# Patient Record
Sex: MALE | Race: WHITE | Employment: OTHER | URBAN - METROPOLITAN AREA
[De-identification: names, ages, dates, MRNs, and addresses within clinical notes are randomized per-mention and may not be internally consistent; named-entity substitution may affect disease eponyms.]

---

## 2017-09-15 ENCOUNTER — OFFICE VISIT (OUTPATIENT)
Dept: FAMILY MEDICINE | Facility: CLINIC | Age: 67
End: 2017-09-15
Payer: MEDICARE

## 2017-09-15 VITALS
HEART RATE: 69 BPM | TEMPERATURE: 97.8 F | OXYGEN SATURATION: 94 % | SYSTOLIC BLOOD PRESSURE: 139 MMHG | DIASTOLIC BLOOD PRESSURE: 89 MMHG | BODY MASS INDEX: 34.32 KG/M2 | HEIGHT: 65 IN | WEIGHT: 206 LBS

## 2017-09-15 DIAGNOSIS — Z23 NEED FOR PROPHYLACTIC VACCINATION AGAINST STREPTOCOCCUS PNEUMONIAE (PNEUMOCOCCUS): ICD-10-CM

## 2017-09-15 DIAGNOSIS — E78.5 HYPERLIPIDEMIA LDL GOAL <130: ICD-10-CM

## 2017-09-15 DIAGNOSIS — Z00.00 ROUTINE GENERAL MEDICAL EXAMINATION AT A HEALTH CARE FACILITY: Primary | ICD-10-CM

## 2017-09-15 DIAGNOSIS — Z13.6 SCREENING FOR ABDOMINAL AORTIC ANEURYSM: ICD-10-CM

## 2017-09-15 DIAGNOSIS — I10 ESSENTIAL HYPERTENSION WITH GOAL BLOOD PRESSURE LESS THAN 140/90: ICD-10-CM

## 2017-09-15 LAB
ALT SERPL W P-5'-P-CCNC: 37 U/L (ref 0–70)
ANION GAP SERPL CALCULATED.3IONS-SCNC: 7 MMOL/L (ref 3–14)
AST SERPL W P-5'-P-CCNC: 18 U/L (ref 0–45)
BUN SERPL-MCNC: 22 MG/DL (ref 7–30)
CALCIUM SERPL-MCNC: 8.8 MG/DL (ref 8.5–10.1)
CHLORIDE SERPL-SCNC: 108 MMOL/L (ref 94–109)
CHOLEST SERPL-MCNC: 168 MG/DL
CK SERPL-CCNC: 66 U/L (ref 30–300)
CO2 SERPL-SCNC: 27 MMOL/L (ref 20–32)
CREAT SERPL-MCNC: 0.89 MG/DL (ref 0.66–1.25)
GFR SERPL CREATININE-BSD FRML MDRD: 86 ML/MIN/1.7M2
GLUCOSE SERPL-MCNC: 104 MG/DL (ref 70–99)
HDLC SERPL-MCNC: 55 MG/DL
LDLC SERPL CALC-MCNC: 85 MG/DL
NONHDLC SERPL-MCNC: 113 MG/DL
POTASSIUM SERPL-SCNC: 4.5 MMOL/L (ref 3.4–5.3)
SODIUM SERPL-SCNC: 142 MMOL/L (ref 133–144)
TRIGL SERPL-MCNC: 142 MG/DL

## 2017-09-15 PROCEDURE — 36415 COLL VENOUS BLD VENIPUNCTURE: CPT | Performed by: FAMILY MEDICINE

## 2017-09-15 PROCEDURE — 84450 TRANSFERASE (AST) (SGOT): CPT | Performed by: FAMILY MEDICINE

## 2017-09-15 PROCEDURE — 84460 ALANINE AMINO (ALT) (SGPT): CPT | Performed by: FAMILY MEDICINE

## 2017-09-15 PROCEDURE — 90732 PPSV23 VACC 2 YRS+ SUBQ/IM: CPT | Performed by: FAMILY MEDICINE

## 2017-09-15 PROCEDURE — 82550 ASSAY OF CK (CPK): CPT | Performed by: FAMILY MEDICINE

## 2017-09-15 PROCEDURE — G0439 PPPS, SUBSEQ VISIT: HCPCS | Performed by: FAMILY MEDICINE

## 2017-09-15 PROCEDURE — 80061 LIPID PANEL: CPT | Performed by: FAMILY MEDICINE

## 2017-09-15 PROCEDURE — 80048 BASIC METABOLIC PNL TOTAL CA: CPT | Performed by: FAMILY MEDICINE

## 2017-09-15 PROCEDURE — G0009 ADMIN PNEUMOCOCCAL VACCINE: HCPCS | Performed by: FAMILY MEDICINE

## 2017-09-15 RX ORDER — LISINOPRIL 20 MG/1
20 TABLET ORAL DAILY
Qty: 90 TABLET | Refills: 3 | Status: SHIPPED | OUTPATIENT
Start: 2017-09-15 | End: 2018-09-14

## 2017-09-15 RX ORDER — SIMVASTATIN 20 MG
20 TABLET ORAL AT BEDTIME
Qty: 90 TABLET | Refills: 3 | Status: SHIPPED | OUTPATIENT
Start: 2017-09-15 | End: 2018-09-14

## 2017-09-15 NOTE — MR AVS SNAPSHOT
After Visit Summary   9/15/2017    Lico Osullivan    MRN: [id number]           Patient Information     Date Of Birth          1950        Visit Information        Provider Department      9/15/2017 10:20 AM Eriberto Payne MD [facility]        Today's Diagnoses     Routine general medical examination at a health care facility    -  1    Hyperlipidemia LDL goal <130        Essential hypertension with goal blood pressure less than 140/90        Need for prophylactic vaccination against Streptococcus pneumoniae (pneumococcus)        Screening for abdominal aortic aneurysm           Follow-ups after your visit        Your next 10 appointments already scheduled     Nov 08, 2017  9:00 AM CST   New Visit with Glynn Neal MD   [facility] ([facility]    [address] [facility] [facility] [address] [phone]              Future tests that were ordered for you today     Open Future Orders        Priority Expected Expires Ordered    US abdominal aorta limited Routine  9/15/2018 9/15/2017            Who to contact     If you have questions or need follow up information about today's clinic visit or your schedule please contact [facility] directly at [phone].  Normal or non-critical lab and imaging results will be communicated to you by MyChart, letter or phone within 4 business days after the clinic has received the results. If you do not hear from us within 7 days, please contact the clinic through MyChart or phone. If you have a critical or abnormal lab result, we will notify you by phone as soon as possible.  Submit refill requests through [employer] or call your pharmacy and they will forward the refill request to us. Please allow 3 business days for your refill to be completed.          Additional Information About Your Visit        MyChart Information     [employer] gives you secure access to your electronic health  "record. If you see a primary care provider, you can also send messages to your care team and make appointments. If you have questions, please call your primary care clinic.  If you do not have a primary care provider, please call [phone] and they will assist you.        Care EveryWhere ID     This is your Care EveryWhere ID. This could be used by other organizations to access your [place] medical records  [id number]        Your Vitals Were     Pulse Temperature Height Pulse Oximetry BMI (Body Mass Index)       69 97.8  F (36.6  C) (Oral) 5' 4.5\" (1.638 m) 94% 34.81 kg/m2        Blood Pressure from Last 3 Encounters:   09/15/17 139/89   09/26/16 159/90   09/15/15 134/84    Weight from Last 3 Encounters:   09/15/17 206 lb (93.4 kg)   09/26/16 208 lb (94.3 kg)   09/15/15 200 lb (90.7 kg)              We Performed the Following     ALT     AST     Basic metabolic panel     CK total     Lipid panel reflex to direct LDL          Where to get your medicines      These medications were sent to [facility] [id number] [place], [place] [address] [address] [facility] [address]  [address] [facility] [address]     Phone:  [phone]     lisinopril 20 MG tablet    simvastatin 20 MG tablet          Primary Care Provider Office Phone # Fax #    Eriberto Payne [phone]       [address] [facility] [address]        Equal Access to Services     TATYANA MARCELINO AH: Hadii aad ku hadasho Soomaali, waaxda luqadaha, qaybta kaalmada adeegyada, magui sanderson. So [facility] [phone].    ATENCIÓN: Si habla español, tiene a lambert disposición servicios gratuitos de asistencia lingüística. Llame al [phone].    We comply with applicable federal civil rights laws and [place] laws. We do not discriminate on the basis of race, color, national origin, age, disability sex, sexual orientation or gender identity.            Thank you!     Thank you for choosing " [facility]  for your care. Our goal is always to provide you with excellent care. Hearing back from our patients is one way we can continue to improve our services. Please take a few minutes to complete the written survey that you may receive in the mail after your visit with us. Thank you!             Your Updated Medication List - Protect others around you: Learn how to safely use, store and throw away your medicines at www.disposemymeds.org.          This list is accurate as of: 9/15/17 11:15 AM.  Always use your most recent med list.                   Brand Name Dispense Instructions for use Diagnosis    aspirin 325 MG EC tablet      Take  by mouth daily. 1 tablet daily    Routine general medical examination at a health care facility       lisinopril 20 MG tablet    PRINIVIL/ZESTRIL    90 tablet    Take 1 tablet (20 mg) by mouth daily    Essential hypertension with goal blood pressure less than 140/90       simvastatin 20 MG tablet    ZOCOR    90 tablet    Take 1 tablet (20 mg) by mouth At Bedtime    Hyperlipidemia LDL goal <130       TYLENOL PO      As needed

## 2017-09-15 NOTE — PROGRESS NOTES
SUBJECTIVE:   Lico Osullivan is a 67 year old male who presents for Preventive Visit.      Are you in the first 12 months of your Medicare coverage?  No    Physical   Annual:     Getting at least 3 servings of Calcium per day::  Yes    Bi-annual eye exam::  Yes    Dental care twice a year::  NO    Sleep apnea or symptoms of sleep apnea::  None    Frequency of exercise::  None    Taking medications regularly::  Yes    Medication side effects::  None    Additional concerns today::  No      COGNITIVE SCREEN  1) Repeat 3 items (Banana, Sunrise, Chair)    2) Clock draw: NORMAL  3) 3 item recall: Recalls 3 objects  Results: 3 items recalled: COGNITIVE IMPAIRMENT LESS LIKELY    Mini-CogTM Copyright S Freda. Licensed by the author for use in [facility]; reprinted with permission ([email or web address]@[email or web address]). All rights reserved.          Reviewed and updated as needed this visit by clinical staffTobacco  Allergies  Med Hx  Surg Hx  Fam Hx  Soc Hx        Reviewed and updated as needed this visit by Provider        Social History   Substance Use Topics     Smoking status: Former Smoker     Quit date: 9/10/1994     Smokeless tobacco: Never Used      Comment: smoke free household.     Alcohol use 3.6 oz/week      Comment: occ.            Standardized Alcohol Screening Questionnaire  AUDIT   Questions 0 1 2 3 4 Score   1. How often do you have a drink  containing alcohol? Never Monthly or less 2 to 4  times a  month 2 to 3  times a  week 4 or more  times a  week  4   2. How many drinks containing alcohol  do you have on a typical day when you are drinking? 1 or 2 3 or 4 5 or 6 7 to 9 10 or more  0   3. How often do you have more than five  or more drinks on one occasion? Never Less  than  monthly Monthly Weekly Daily or  almost  daily  0   4. How often during the last year have  you found that you were not able to stop drinking once you had started? Never Less  than  monthly Monthly Weekly Daily or  almost  daily  0    5. How often during the last year have  you failed to do what was normally expected of you because of drinking? Never Less  than  monthly Monthly Weekly Daily or  almost  daily  0   6. How often during the last year have  you needed a first drink in the morning to get yourself going after a heavy drinking session? Never Less  than  monthly Monthly Weekly Daily or  almost  daily  0   7. How often during the last year have you had a feeling of guilt or remorse after drinking? Never Less  than  monthly Monthly Weekly Daily or  almost  daily  0   8. How often during the last year have  you been unable to remember what happened the night before because of your drinking? Never Less  than  monthly Monthly Weekly Daily or  almost  daily  0   9. Have you or someone else been  injured because of your drinking? No  Yes, but not in the last year  Yes,  during the  last year  0   10. Has a relative, friend, doctor or other health care worker been concerned about your drinking or suggested you cut down? No  Yes, but not in the last year  Yes,  during the  last year  0   Total  4   Scoring: A score of 7 for adult men is an indication of hazardous drinking (risk for physical or physiological harm); a score of 8 or more is an indication of an alcohol use disorder. A score of 5 or more for adult women  is an indication of hazardous drinking or an alcohol use disorder.             Today's PHQ-2 Score:   PHQ-2 ( 1999 [employer]) 9/15/2017   Q1: Little interest or pleasure in doing things 0   Q2: Feeling down, depressed or hopeless 0   PHQ-2 Score 0   Q1: Little interest or pleasure in doing things Not at all   Q2: Feeling down, depressed or hopeless Not at all   PHQ-2 Score 0       Do you feel safe in your environment - Yes    Do you have a Health Care Directive?: No: Advance care planning was reviewed with patient; patient declined at this time.      Current providers sharing in care for this patient include: Patient Care  Team:  Eriberto Payne MD as PCP - General      Hearing impairment: No    Ability to successfully perform activities of daily living: Yes, no assistance needed     Fall risk:  Fallen 2 or more times in the past year?: No  Any fall with injury in the past year?: No      Home safety:  none identified      The following health maintenance items are reviewed in Epic and correct as of today:Health Maintenance   Topic Date Due     AORTIC ANEURYSM SCREENING (SYSTEM ASSIGNED)  06/05/2016     ADVANCE DIRECTIVE PLANNING Q5 YRS  07/07/2016     INFLUENZA VACCINE (SYSTEM ASSIGNED)  09/01/2017     FALL RISK ASSESSMENT  09/26/2017     PNEUMOCOCCAL (2 of 2 - PPSV23) 09/26/2017     EYE EXAM Q1 YEAR  11/17/2017     COLON CANCER SCREEN (SYSTEM ASSIGNED)  08/09/2021     LIPID SCREEN Q5 YR MALE (SYSTEM ASSIGNED)  09/26/2021     TETANUS IMMUNIZATION (SYSTEM ASSIGNED)  09/26/2026     HEPATITIS C SCREENING  Completed     BP Readings from Last 3 Encounters:   09/15/17 139/89   09/26/16 159/90   09/15/15 134/84    Wt Readings from Last 3 Encounters:   09/15/17 206 lb (93.4 kg)   09/26/16 208 lb (94.3 kg)   09/15/15 200 lb (90.7 kg)                  Patient Active Problem List   Diagnosis     Hx of retinal hemorrhage, remotely, os     Prostate CA (H)     Colon polyp     HYPERLIPIDEMIA LDL GOAL <130     Advanced directives, counseling/discussion     Hypertension goal BP (blood pressure) < 140/90     Vitreous condensations, os     Pseuophakia, Yag Caps, ou     Severe obesity (BMI 35.0-35.9 with comorbidity) (H)     Past Surgical History:   Procedure Laterality Date     CATARACT IOL, RT/LT       CL AFF SURGICAL PATHOLOGY      2006     COLONOSCOPY  8/9/2011    Procedure:COMBINED COLONOSCOPY, REMOVE TUMOR/POLYP/LESION BY SNARE; Surgeon:ALBARO LANGFORD; Location:[facility] OR     LASER YAG CAPSULOTOMY  10/2015; 11/2016    left eye; right eye     PHACOEMULSIFICATION WITH STANDARD INTRAOCULAR LENS IMPLANT  10/2012; 6/2014    left eye; right eye      SUPRAPUBIC PROSTATECTOMY  2006       Social History   Substance Use Topics     Smoking status: Former Smoker     Quit date: 9/10/1994     Smokeless tobacco: Never Used      Comment: smoke free household.     Alcohol use 3.6 oz/week      Comment: occ.     Family History   Problem Relation Age of Onset     C.A.D. Father      Psychotic Disorder Father      schizophrenia     Prostate Cancer Father      full surgery     Asthma Other      nephew     Prostate Cancer Paternal Grandfather      Hypertension Sister      Psychotic Disorder Sister      schizophrenia     Prostate Cancer Maternal Grandmother      Prostate Cancer Maternal Grandfather      partial surgery     Asthma Other          Current Outpatient Prescriptions   Medication Sig Dispense Refill     simvastatin (ZOCOR) 20 MG tablet Take 1 tablet (20 mg) by mouth At Bedtime 90 tablet 3     lisinopril (PRINIVIL,ZESTRIL) 20 MG tablet Take 1 tablet (20 mg) by mouth daily 90 tablet 3     Acetaminophen (TYLENOL PO) As needed       aspirin 325 MG EC tablet Take  by mouth daily. 1 tablet daily         No Known Allergies        Pneumonia Vaccine:Adults age 65+ who received Pneumovax (PPSV23) at 65 years or older: Should be given PCV13 > 1 year after their most recent PPSV23    ROS:  C: NEGATIVE for fever, chills, change in weight  I: NEGATIVE for worrisome rashes, moles or lesions  E: NEGATIVE for vision changes or irritation  E/M: NEGATIVE for ear, mouth and throat problems  R: NEGATIVE for significant cough or SOB  B: NEGATIVE for masses, tenderness or discharge  CV: NEGATIVE for chest pain, palpitations or peripheral edema  GI: NEGATIVE for nausea, abdominal pain, heartburn, or change in bowel habits  : NEGATIVE for frequency, dysuria, or hematuria  M: NEGATIVE for significant arthralgias or myalgia  N: NEGATIVE for weakness, dizziness or paresthesias  E: NEGATIVE for temperature intolerance, skin/hair changes  H: NEGATIVE for bleeding problems  P: NEGATIVE for changes  "in mood or affect    OBJECTIVE:   /89 (Cuff Size: Adult Large)  Pulse 69  Temp 97.8  F (36.6  C) (Oral)  Ht 5' 4.5\" (1.638 m)  Wt 206 lb (93.4 kg)  SpO2 94%  BMI 34.81 kg/m2 Estimated body mass index is 34.81 kg/(m^2) as calculated from the following:    Height as of this encounter: 5' 4.5\" (1.638 m).    Weight as of this encounter: 206 lb (93.4 kg).  EXAM:   GENERAL: healthy, alert and no distress  EYES: Eyes grossly normal to inspection, PERRL and conjunctivae and sclerae normal  HENT: ear canals and TM's normal, nose and mouth without ulcers or lesions  NECK: no adenopathy, no asymmetry, masses, or scars and thyroid normal to palpation  RESP: lungs clear to auscultation - no rales, rhonchi or wheezes  CV: regular rate and rhythm, normal S1 S2, no S3 or S4, no murmur, click or rub, no peripheral edema and peripheral pulses strong  ABDOMEN: soft, nontender, no hepatosplenomegaly, no masses and bowel sounds normal  MS: no gross musculoskeletal defects noted, no edema  SKIN: no suspicious lesions or rashes  NEURO: Normal strength and tone, mentation intact and speech normal  PSYCH: mentation appears normal, affect normal/bright    ASSESSMENT / PLAN:       ICD-10-CM    1. Need for prophylactic vaccination against Streptococcus pneumoniae (pneumococcus) Z23    2. Hyperlipidemia LDL goal <130 E78.5 simvastatin (ZOCOR) 20 MG tablet     Lipid panel reflex to direct LDL     AST     ALT     CK total   3. Essential hypertension with goal blood pressure less than 140/90 I10 lisinopril (PRINIVIL/ZESTRIL) 20 MG tablet     Basic metabolic panel       End of Life Planning:  Patient currently has an advanced directive: No.  I have verified the patient's ablity to prepare an advanced directive/make health care decisions.  Literature was provided to assist patient in preparing an advanced directive.    COUNSELING:  Reviewed preventive health counseling, as reflected in patient instructions       Regular exercise       " "Healthy diet/nutrition    Wt Readings from Last 2 Encounters:   09/15/17 206 lb (93.4 kg)   09/26/16 208 lb (94.3 kg)         Estimated body mass index is 34.81 kg/(m^2) as calculated from the following:    Height as of this encounter: 5' 4.5\" (1.638 m).    Weight as of this encounter: 206 lb (93.4 kg).  Weight management plan: Discussed healthy diet and exercise guidelines and patient will follow up in 12 months in clinic to re-evaluate.   reports that he quit smoking about 23 years ago. He has never used smokeless tobacco.        Appropriate preventive services were discussed with this patient, including applicable screening as appropriate for cardiovascular disease, diabetes, osteopenia/osteoporosis, and glaucoma.  As appropriate for age/gender, discussed screening for colorectal cancer, prostate cancer, breast cancer, and cervical cancer. Checklist reviewing preventive services available has been given to the patient.    Reviewed patients plan of care and provided an AVS. The Basic Care Plan (routine screening as documented in Health Maintenance) for Lico meets the Care Plan requirement. This Care Plan has been established and reviewed with the Patient.    Counseling Resources:  ATP IV Guidelines  Pooled Cohorts Equation Calculator  Breast Cancer Risk Calculator  FRAX Risk Assessment  ICSI Preventive Guidelines  Dietary Guidelines for Americans, 2010  USDA's MyPlate  ASA Prophylaxis  Lung CA Screening    Eriberto Payne MD  [facility] for HPI/ROS submitted by the patient on 9/15/2017   PHQ-2 Score: 0    "

## 2017-09-15 NOTE — NURSING NOTE
"Chief Complaint   Patient presents with     Wellness Visit       Initial /89 (Cuff Size: Adult Large)  Pulse 69  Temp 97.8  F (36.6  C) (Oral)  Ht 5' 4.5\" (1.638 m)  Wt 206 lb (93.4 kg)  SpO2 94%  BMI 34.81 kg/m2 Estimated body mass index is 34.81 kg/(m^2) as calculated from the following:    Height as of this encounter: 5' 4.5\" (1.638 m).    Weight as of this encounter: 206 lb (93.4 kg).  Medication Reconciliation: complete   Chaya Nguyen, Certified Medical Assistant (AAMA)     "

## 2017-09-22 NOTE — PROGRESS NOTES
Your basic metabolic panel which includes electrolytes,kidney function is normal and  -Glucose (diabetic screening test) is elevated, mildly.     Follow up in 1 year(s)

## 2017-10-13 ENCOUNTER — TELEPHONE (OUTPATIENT)
Dept: FAMILY MEDICINE | Facility: CLINIC | Age: 67
End: 2017-10-13

## 2017-10-13 NOTE — TELEPHONE ENCOUNTER
Reason for Call:  Other Insurance follow up    Detailed comments: Gretta with [facility] called about order for ultrasound for aneurysm is not covered by patient's insurance.  Test can be scheduled as soon as patient has been informed by PCP tht insurance will not cover.    Phone Number Patient can be reached at: Other phone number:   [phone] to reach Gretta    Best Time: Any    Can we leave a detailed message on this number? YES    Call taken on 10/13/2017 at 2:06 PM by Rebel Ruelas

## 2017-10-15 DIAGNOSIS — I10 ESSENTIAL HYPERTENSION WITH GOAL BLOOD PRESSURE LESS THAN 140/90: ICD-10-CM

## 2017-10-16 RX ORDER — LISINOPRIL 20 MG/1
TABLET ORAL
Qty: 90 TABLET | Refills: 0 | OUTPATIENT
Start: 2017-10-16

## 2017-10-16 NOTE — TELEPHONE ENCOUNTER
Patient called back, I provided him with the phone number to call for [facility] to screen for Abdominal Aortic Aneurysm.   He says he will call, he smoked a long time ago.  I advised him that was my understanding, that past smokers were advised to be screened.  He will let us know if he thinks the [facility] screen is more than he wants to spend and then Dr. Payne can address how much risk he really feels patient might have.    Kori Mix RN  [facility]

## 2017-10-16 NOTE — TELEPHONE ENCOUNTER
Please call patient and tell him this is not covered by his insurance.  He can get AAA screening through [facility] screening  for a cheaper cost.  He would have to find a place doing that kind of screening.

## 2017-10-16 NOTE — TELEPHONE ENCOUNTER
[facility] Screening number: [phone].  Unable to get more information as have to enter patient information in website.      Attempted to call patient at [phone] (home), no answer.  Left VM to return call to RN Triage line.    Harriet Ramires RN  [facility]

## 2017-10-16 NOTE — TELEPHONE ENCOUNTER
"lisinopril      Last Written Prescription Date: 9/15/17  Last Fill Quantity: 90,  # refills: 3 sent to [facility].  Should not need refills        \"Refusal\" routed back to pharmacy with note.  Kori Mix RN  [facility]                                            "

## 2017-11-08 ENCOUNTER — OFFICE VISIT (OUTPATIENT)
Dept: OPHTHALMOLOGY | Facility: CLINIC | Age: 67
End: 2017-11-08
Payer: MEDICARE

## 2017-11-08 DIAGNOSIS — Z96.1 PSEUDOPHAKIA: Primary | ICD-10-CM

## 2017-11-08 DIAGNOSIS — D31.31 CHOROIDAL NEVUS OF RIGHT EYE: ICD-10-CM

## 2017-11-08 DIAGNOSIS — H35.62 RETINAL HEMORRHAGE OF LEFT EYE: ICD-10-CM

## 2017-11-08 DIAGNOSIS — H52.4 PRESBYOPIA: ICD-10-CM

## 2017-11-08 DIAGNOSIS — H43.399 VITREOUS OPACITIES: ICD-10-CM

## 2017-11-08 PROCEDURE — 92015 DETERMINE REFRACTIVE STATE: CPT | Mod: GY | Performed by: OPHTHALMOLOGY

## 2017-11-08 PROCEDURE — 92014 COMPRE OPH EXAM EST PT 1/>: CPT | Performed by: OPHTHALMOLOGY

## 2017-11-08 ASSESSMENT — VISUAL ACUITY
OD_CC: 20/20
OS_CC: 20/25
METHOD: SNELLEN - LINEAR
CORRECTION_TYPE: GLASSES

## 2017-11-08 ASSESSMENT — EXTERNAL EXAM - LEFT EYE: OS_EXAM: NORMAL

## 2017-11-08 ASSESSMENT — REFRACTION_WEARINGRX
OD_SPHERE: -0.75
OS_AXIS: 012
OD_ADD: +2.75
SPECS_TYPE: PAL
OS_CYLINDER: +0.75
OS_SPHERE: -1.25
OS_ADD: +2.75
OD_AXIS: 176
OD_CYLINDER: +0.25

## 2017-11-08 ASSESSMENT — REFRACTION_MANIFEST
OD_SPHERE: -1.00
OD_ADD: +2.75
OD_CYLINDER: SPHERE
OS_ADD: +2.75
OS_SPHERE: -1.50
OS_CYLINDER: +0.25
OS_AXIS: 180

## 2017-11-08 ASSESSMENT — TONOMETRY
IOP_METHOD: APPLANATION
OS_IOP_MMHG: 21
OD_IOP_MMHG: 19

## 2017-11-08 ASSESSMENT — CUP TO DISC RATIO
OS_RATIO: 0.5
OD_RATIO: 0.3

## 2017-11-08 ASSESSMENT — CONF VISUAL FIELD
OD_NORMAL: 1
OS_NORMAL: 1

## 2017-11-08 ASSESSMENT — SLIT LAMP EXAM - LIDS: COMMENTS: 1+ DERMATOCHALASIS - UPPER LID, 1+ SCLERAL SHOW, 1+ MEIBOMIAN GLAND DYSFUNCTION

## 2017-11-08 NOTE — PATIENT INSTRUCTIONS
Glasses Rx given - optional   Possible posterior vitreous detachment (sudden onset large floater and/or flashing lights) discussed.   Use artificial tears up to 4 times daily both eyes. (Refresh Tears or Systane Ultra/Balance)   Call in July 2018 for an appointment in November 2018 for Complete Exam    Dr. Neal [phone]

## 2017-11-08 NOTE — MR AVS SNAPSHOT
After Visit Summary   11/8/2017    Lico Osullivan    MRN: [id number]           Patient Information     Date Of Birth          1950        Visit Information        Provider Department      11/8/2017 9:00 AM Glynn Neal MD [facility]        Today's Diagnoses     Presbyopia    -  1    Choroidal nevus of right eye        Pseuophakia, Yag Caps, ou        Vitreous condensations, os        Hx of retinal hemorrhage, remotely os           Care Instructions    Glasses Rx given - optional   Possible posterior vitreous detachment (sudden onset large floater and/or flashing lights) discussed.   Use artificial tears up to 4 times daily both eyes. (Refresh Tears or Systane Ultra/Balance)   Call in July 2018 for an appointment in November 2018 for Complete Exam    Dr. Neal [phone]          Follow-ups after your visit        Who to contact     If you have questions or need follow up information about today's clinic visit or your schedule please contact [facility] directly at [phone].  Normal or non-critical lab and imaging results will be communicated to you by [employer], letter or phone within 4 business days after the clinic has received the results. If you do not hear from us within 7 days, please contact the clinic through [employer] or phone. If you have a critical or abnormal lab result, we will notify you by phone as soon as possible.  Submit refill requests through [employer] or call your pharmacy and they will forward the refill request to us. Please allow 3 business days for your refill to be completed.          Additional Information About Your Visit        [employer] Information     [employer] gives you secure access to your electronic health record. If you see a primary care provider, you can also send messages to your care team and make appointments. If you have questions, please call your primary care clinic.  If you do not have a primary care provider, please call  [phone] and they will assist you.        Care EveryWhere ID     This is your Care EveryWhere ID. This could be used by other organizations to access your [place] medical records  [id number]         Blood Pressure from Last 3 Encounters:   09/15/17 139/89   09/26/16 159/90   09/15/15 134/84    Weight from Last 3 Encounters:   09/15/17 93.4 kg (206 lb)   09/26/16 94.3 kg (208 lb)   09/15/15 90.7 kg (200 lb)              We Performed the Following     EYE EXAM (SIMPLE-NONBILLABLE)     REFRACTIVE STATUS        Primary Care Provider Office Phone # Fax #    Eriberto Payne [phone]       [address] [facility] [address]        Equal Access to Services     PASCUAL MARCELINO : Hadii jinny vargas hadasho Soomaali, waaxda luqadaha, qaybta kaalmada adeegyada, waxzelda stewart haydenise kirkland . So [facility] [phone].    ATENCIÓN: Si habla español, tiene a lambert disposición servicios gratuitos de asistencia lingüística. Preeti al [phone].    We comply with applicable federal civil rights laws and [place] laws. We do not discriminate on the basis of race, color, national origin, age, disability, sex, sexual orientation, or gender identity.            Thank you!     Thank you for choosing [facility]  for your care. Our goal is always to provide you with excellent care. Hearing back from our patients is one way we can continue to improve our services. Please take a few minutes to complete the written survey that you may receive in the mail after your visit with us. Thank you!             Your Updated Medication List - Protect others around you: Learn how to safely use, store and throw away your medicines at www.disposemymeds.org.          This list is accurate as of: 11/8/17 10:23 AM.  Always use your most recent med list.                   Brand Name Dispense Instructions for use Diagnosis    aspirin 325 MG EC tablet      Take  by mouth daily. 1 tablet daily    Routine  general medical examination at a health care facility       lisinopril 20 MG tablet    PRINIVIL/ZESTRIL    90 tablet    Take 1 tablet (20 mg) by mouth daily    Essential hypertension with goal blood pressure less than 140/90       simvastatin 20 MG tablet    ZOCOR    90 tablet    Take 1 tablet (20 mg) by mouth At Bedtime    Hyperlipidemia LDL goal <130       TYLENOL PO      As needed

## 2017-11-08 NOTE — PROGRESS NOTES
Current Eye Medications:  Refresh tears both eyes prn, BID     Subjective:  Here for complete today.  Feels the eyes are dry, using tears usually as needed. Hasn't gotten new glasses since after cataract surgery was done     Objective:  See Ophthalmology Exam.       Assessment:  Stable eye exam.      ICD-10-CM    1. Pseuophakia, Yag Caps, ou Z96.1 EYE EXAM (SIMPLE-NONBILLABLE)   2. 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 Hx of retinal hemorrhage of left eye, remotely H35.62    5. Presbyopia H52.4 REFRACTIVE STATUS        Plan:  Glasses Rx given - optional   Possible posterior vitreous detachment (sudden onset large floater and/or flashing lights) discussed.   Use artificial tears up to 4 times daily both eyes. (Refresh Tears or Systane Ultra/Balance)   Call in July 2018 for an appointment in November 2018 for Complete Exam    Dr. Neal [phone]

## 2017-11-09 PROBLEM — H35.62 RETINAL HEMORRHAGE OF LEFT EYE: Status: ACTIVE | Noted: 2017-11-09

## 2018-09-11 ASSESSMENT — ENCOUNTER SYMPTOMS
PARESTHESIAS: 0
SHORTNESS OF BREATH: 0
SORE THROAT: 0
CHILLS: 0
ABDOMINAL PAIN: 0
HEMATOCHEZIA: 0
WEAKNESS: 0
FREQUENCY: 0
HEMATURIA: 0
COUGH: 0
FEVER: 0
EYE PAIN: 0
DIARRHEA: 0
HEARTBURN: 0
DYSURIA: 0
JOINT SWELLING: 0
PALPITATIONS: 0
MYALGIAS: 0
HEADACHES: 0
ARTHRALGIAS: 0
CONSTIPATION: 0
DIZZINESS: 0
NERVOUS/ANXIOUS: 0
NAUSEA: 0

## 2018-09-11 ASSESSMENT — ACTIVITIES OF DAILY LIVING (ADL)
CURRENT_FUNCTION: NO ASSISTANCE NEEDED
I_NEED_ASSISTANCE_FOR_THE_FOLLOWING_DAILY_ACTIVITIES:: NO ASSISTANCE IS NEEDED

## 2018-09-14 ENCOUNTER — OFFICE VISIT (OUTPATIENT)
Dept: FAMILY MEDICINE | Facility: CLINIC | Age: 68
End: 2018-09-14
Payer: MEDICARE

## 2018-09-14 VITALS
HEIGHT: 65 IN | SYSTOLIC BLOOD PRESSURE: 132 MMHG | TEMPERATURE: 97.6 F | WEIGHT: 216 LBS | DIASTOLIC BLOOD PRESSURE: 91 MMHG | HEART RATE: 62 BPM | BODY MASS INDEX: 35.99 KG/M2

## 2018-09-14 DIAGNOSIS — E78.5 HYPERLIPIDEMIA LDL GOAL <130: ICD-10-CM

## 2018-09-14 DIAGNOSIS — Z23 NEED FOR SHINGLES VACCINE: ICD-10-CM

## 2018-09-14 DIAGNOSIS — Z13.6 SCREENING FOR ABDOMINAL AORTIC ANEURYSM: ICD-10-CM

## 2018-09-14 DIAGNOSIS — Z00.01 ENCOUNTER FOR ROUTINE ADULT HEALTH EXAMINATION WITH ABNORMAL FINDINGS: Primary | ICD-10-CM

## 2018-09-14 DIAGNOSIS — I10 ESSENTIAL HYPERTENSION WITH GOAL BLOOD PRESSURE LESS THAN 140/90: ICD-10-CM

## 2018-09-14 LAB
ALT SERPL W P-5'-P-CCNC: 49 U/L (ref 0–70)
ANION GAP SERPL CALCULATED.3IONS-SCNC: 5 MMOL/L (ref 3–14)
AST SERPL W P-5'-P-CCNC: 30 U/L (ref 0–45)
BASOPHILS # BLD AUTO: 0 10E9/L (ref 0–0.2)
BASOPHILS NFR BLD AUTO: 0.3 %
BUN SERPL-MCNC: 20 MG/DL (ref 7–30)
CALCIUM SERPL-MCNC: 8.8 MG/DL (ref 8.5–10.1)
CHLORIDE SERPL-SCNC: 106 MMOL/L (ref 94–109)
CHOLEST SERPL-MCNC: 193 MG/DL
CK SERPL-CCNC: 78 U/L (ref 30–300)
CO2 SERPL-SCNC: 30 MMOL/L (ref 20–32)
CREAT SERPL-MCNC: 0.88 MG/DL (ref 0.66–1.25)
DIFFERENTIAL METHOD BLD: NORMAL
EOSINOPHIL # BLD AUTO: 0.2 10E9/L (ref 0–0.7)
EOSINOPHIL NFR BLD AUTO: 2.6 %
ERYTHROCYTE [DISTWIDTH] IN BLOOD BY AUTOMATED COUNT: 13.6 % (ref 10–15)
GFR SERPL CREATININE-BSD FRML MDRD: 86 ML/MIN/1.7M2
GLUCOSE SERPL-MCNC: 106 MG/DL (ref 70–99)
HCT VFR BLD AUTO: 47.1 % (ref 40–53)
HDLC SERPL-MCNC: 59 MG/DL
HGB BLD-MCNC: 16.4 G/DL (ref 13.3–17.7)
LDLC SERPL CALC-MCNC: 104 MG/DL
LYMPHOCYTES # BLD AUTO: 1.4 10E9/L (ref 0.8–5.3)
LYMPHOCYTES NFR BLD AUTO: 20.2 %
MCH RBC QN AUTO: 32.7 PG (ref 26.5–33)
MCHC RBC AUTO-ENTMCNC: 34.8 G/DL (ref 31.5–36.5)
MCV RBC AUTO: 94 FL (ref 78–100)
MONOCYTES # BLD AUTO: 0.7 10E9/L (ref 0–1.3)
MONOCYTES NFR BLD AUTO: 10 %
NEUTROPHILS # BLD AUTO: 4.7 10E9/L (ref 1.6–8.3)
NEUTROPHILS NFR BLD AUTO: 66.9 %
NONHDLC SERPL-MCNC: 134 MG/DL
PLATELET # BLD AUTO: 186 10E9/L (ref 150–450)
POTASSIUM SERPL-SCNC: 4.5 MMOL/L (ref 3.4–5.3)
RBC # BLD AUTO: 5.01 10E12/L (ref 4.4–5.9)
SODIUM SERPL-SCNC: 141 MMOL/L (ref 133–144)
TRIGL SERPL-MCNC: 151 MG/DL
WBC # BLD AUTO: 7 10E9/L (ref 4–11)

## 2018-09-14 PROCEDURE — 85025 COMPLETE CBC W/AUTO DIFF WBC: CPT | Performed by: FAMILY MEDICINE

## 2018-09-14 PROCEDURE — 36415 COLL VENOUS BLD VENIPUNCTURE: CPT | Performed by: FAMILY MEDICINE

## 2018-09-14 PROCEDURE — 80061 LIPID PANEL: CPT | Performed by: FAMILY MEDICINE

## 2018-09-14 PROCEDURE — 82550 ASSAY OF CK (CPK): CPT | Performed by: FAMILY MEDICINE

## 2018-09-14 PROCEDURE — 84460 ALANINE AMINO (ALT) (SGPT): CPT | Performed by: FAMILY MEDICINE

## 2018-09-14 PROCEDURE — G0439 PPPS, SUBSEQ VISIT: HCPCS | Performed by: FAMILY MEDICINE

## 2018-09-14 PROCEDURE — 80048 BASIC METABOLIC PNL TOTAL CA: CPT | Performed by: FAMILY MEDICINE

## 2018-09-14 PROCEDURE — 84450 TRANSFERASE (AST) (SGOT): CPT | Performed by: FAMILY MEDICINE

## 2018-09-14 RX ORDER — LISINOPRIL 20 MG/1
20 TABLET ORAL DAILY
Qty: 90 TABLET | Refills: 3 | Status: SHIPPED | OUTPATIENT
Start: 2018-09-14 | End: 2019-08-29

## 2018-09-14 RX ORDER — SIMVASTATIN 20 MG
20 TABLET ORAL AT BEDTIME
Qty: 90 TABLET | Refills: 3 | Status: SHIPPED | OUTPATIENT
Start: 2018-09-14 | End: 2019-08-29

## 2018-09-14 ASSESSMENT — ENCOUNTER SYMPTOMS
WEAKNESS: 0
CHILLS: 0
NERVOUS/ANXIOUS: 0
CONSTIPATION: 0
SORE THROAT: 0
DIZZINESS: 0
HEARTBURN: 0
HEMATOCHEZIA: 0
HEMATURIA: 0
ABDOMINAL PAIN: 0
PALPITATIONS: 0
HEADACHES: 0
NAUSEA: 0
EYE PAIN: 0
PARESTHESIAS: 0
COUGH: 0
ARTHRALGIAS: 0
MYALGIAS: 0
SHORTNESS OF BREATH: 0
FEVER: 0
FREQUENCY: 0
JOINT SWELLING: 0
DYSURIA: 0
DIARRHEA: 0

## 2018-09-14 ASSESSMENT — ACTIVITIES OF DAILY LIVING (ADL): CURRENT_FUNCTION: NO ASSISTANCE NEEDED

## 2018-09-14 NOTE — PROGRESS NOTES
SUBJECTIVE:   Lico Osullivan is a 68 year old male who presents for Preventive Visit.      Are you in the first 12 months of your Medicare coverage?  No    Physical   Annual:     Getting at least 3 servings of Calcium per day:  Yes    Bi-annual eye exam:  Yes    Dental care twice a year:  Yes    Sleep apnea or symptoms of sleep apnea:  Daytime drowsiness    Diet:  Regular (no restrictions)    Frequency of exercise:  None    Taking medications regularly:  Yes    Medication side effects:  None    Additional concerns today:  No    Ability to successfully perform activities of daily living: no assistance needed    Home Safety:  No safety concerns identified    Hearing Impairment: no hearing concerns        Fall risk:  Fallen 2 or more times in the past year?: No  Any fall with injury in the past year?: No    COGNITIVE SCREEN  1) Repeat 3 items (Leader, Season, Table)    2) Clock draw: NORMAL  3) 3 item recall: Recalls 3 objects  Results: 3 items recalled: COGNITIVE IMPAIRMENT LESS LIKELY    Mini-CogTM Copyright S Freda. Licensed by the author for use in [facility]; reprinted with permission (soob@[facility]). All rights reserved.        Reviewed and updated as needed this visit by clinical staff         Reviewed and updated as needed this visit by Provider        Social History   Substance Use Topics     Smoking status: Former Smoker     Quit date: 9/10/1994     Smokeless tobacco: Never Used      Comment: smoke free household.     Alcohol use 3.6 oz/week      Comment: occ.       Alcohol Use 9/11/2018   If you drink alcohol do you typically have greater than 3 drinks per day OR greater than 7 drinks per week? No       Today's PHQ-2 Score:   PHQ-2 ( 1999 Pf[employer]) 9/11/2018   Q1: Little interest or pleasure in doing things 0   Q2: Feeling down, depressed or hopeless 0   PHQ-2 Score 0   Q1: Little interest or pleasure in doing things Not at all   Q2: Feeling down, depressed or hopeless Not at all   PHQ-2 Score 0        Do you feel safe in your environment - Yes    Do you have a Health Care Directive?: Yes: Advance Directive has been received and scanned.    Current providers sharing in care for this patient include:   Patient Care Team:  Eriberto Payne MD as PCP - General    The following health maintenance items are reviewed in Epic and correct as of today:  Health Maintenance   Topic Date Due     AORTIC ANEURYSM SCREENING (SYSTEM ASSIGNED)  06/05/2016     ADVANCE DIRECTIVE PLANNING Q5 YRS  07/07/2016     INFLUENZA VACCINE (1) 09/01/2018     FALL RISK ASSESSMENT  09/15/2018     EYE EXAM Q1 YEAR  11/08/2018     PHQ-2 Q1 YR  09/14/2019     COLON CANCER SCREEN (SYSTEM ASSIGNED)  08/09/2021     LIPID SCREEN Q5 YR MALE (SYSTEM ASSIGNED)  09/15/2022     TETANUS IMMUNIZATION (SYSTEM ASSIGNED)  09/26/2026     PNEUMOCOCCAL  Completed     HEPATITIS C SCREENING  Completed     BP Readings from Last 3 Encounters:   09/14/18 (!) 132/91   09/15/17 139/89   09/26/16 159/90    Wt Readings from Last 3 Encounters:   09/14/18 216 lb (98 kg)   09/15/17 206 lb (93.4 kg)   09/26/16 208 lb (94.3 kg)                  Patient Active Problem List   Diagnosis     Prostate CA (H)     Colon polyp     HYPERLIPIDEMIA LDL GOAL <130     Advanced directives, counseling/discussion     Hypertension goal BP (blood pressure) < 140/90     Vitreous condensations, os     Pseuophakia, Yag Caps, ou     Severe obesity (BMI 35.0-35.9 with comorbidity) (H)     Hx of retinal hemorrhage of left eye, remotely     Past Surgical History:   Procedure Laterality Date     CATARACT IOL, RT/LT       CL AFF SURGICAL PATHOLOGY      2006     COLONOSCOPY  8/9/2011    Procedure:COMBINED COLONOSCOPY, REMOVE TUMOR/POLYP/LESION BY SNARE; Surgeon:ALBARO LANGFORD; Location:[facility] OR     LASER YAG CAPSULOTOMY  10/2015; 11/2016    left eye; right eye     PHACOEMULSIFICATION WITH STANDARD INTRAOCULAR LENS IMPLANT  10/2012; 6/2014    left eye; right eye     SUPRAPUBIC PROSTATECTOMY  2006        Social History   Substance Use Topics     Smoking status: Former Smoker     Quit date: 9/10/1994     Smokeless tobacco: Never Used      Comment: smoke free household.     Alcohol use 3.6 oz/week      Comment: occ.     Family History   Problem Relation Age of Onset     C.A.D. Father      Psychotic Disorder Father      schizophrenia     Prostate Cancer Father      full surgery     Asthma Other      nephew     Prostate Cancer Paternal Grandfather      Hypertension Sister      Psychotic Disorder Sister      schizophrenia     Prostate Cancer Maternal Grandmother      Prostate Cancer Maternal Grandfather      partial surgery     Asthma Other          Current Outpatient Prescriptions   Medication Sig Dispense Refill     Acetaminophen (TYLENOL PO) As needed       aspirin 325 MG EC tablet Take  by mouth daily. 1 tablet daily         lisinopril (PRINIVIL/ZESTRIL) 20 MG tablet Take 1 tablet (20 mg) by mouth daily 90 tablet 3     simvastatin (ZOCOR) 20 MG tablet Take 1 tablet (20 mg) by mouth At Bedtime 90 tablet 3     No Known Allergies      Review of Systems   Constitutional: Negative for chills and fever.   HENT: Negative for congestion, ear pain, hearing loss and sore throat.    Eyes: Negative for pain and visual disturbance.   Respiratory: Negative for cough and shortness of breath.    Cardiovascular: Negative for chest pain, palpitations and peripheral edema.   Gastrointestinal: Negative for abdominal pain, constipation, diarrhea, heartburn, hematochezia and nausea.   Genitourinary: Negative for discharge, dysuria, frequency, genital sores, hematuria, impotence and urgency.   Musculoskeletal: Negative for arthralgias, joint swelling and myalgias.   Skin: Negative for rash.   Neurological: Negative for dizziness, weakness, headaches and paresthesias.   Psychiatric/Behavioral: Negative for mood changes. The patient is not nervous/anxious.      CONSTITUTIONAL: NEGATIVE for fever, chills, change in  "weight  INTEGUMENTARY/SKIN: NEGATIVE for worrisome rashes, moles or lesions  EYES: NEGATIVE for vision changes or irritation  ENT/MOUTH: NEGATIVE for ear, mouth and throat problems  RESP: NEGATIVE for significant cough or SOB  BREAST: NEGATIVE for masses, tenderness or discharge  CV: NEGATIVE for chest pain, palpitations or peripheral edema  GI: NEGATIVE for nausea, abdominal pain, heartburn, or change in bowel habits  : NEGATIVE for frequency, dysuria, or hematuria  MUSCULOSKELETAL: NEGATIVE for significant arthralgias or myalgia  NEURO: NEGATIVE for weakness, dizziness or paresthesias  ENDOCRINE: NEGATIVE for temperature intolerance, skin/hair changes  HEME: NEGATIVE for bleeding problems  PSYCHIATRIC: NEGATIVE for changes in mood or affect    OBJECTIVE:   BP (!) 132/91  Pulse 62  Temp 97.6  F (36.4  C) (Oral)  Ht 5' 4.5\" (1.638 m)  Wt 216 lb (98 kg)  BMI 36.5 kg/m2 Estimated body mass index is 34.81 kg/(m^2) as calculated from the following:    Height as of 9/15/17: 5' 4.5\" (1.638 m).    Weight as of 9/15/17: 206 lb (93.4 kg).  Physical Exam  GENERAL: healthy, alert and no distress  EYES: Eyes grossly normal to inspection, PERRL and conjunctivae and sclerae normal  HENT: ear canals and TM's normal, nose and mouth without ulcers or lesions  NECK: no adenopathy, no asymmetry, masses, or scars and thyroid normal to palpation  RESP: lungs clear to auscultation - no rales, rhonchi or wheezes  CV: regular rate and rhythm, normal S1 S2, no S3 or S4, no murmur, click or rub, no peripheral edema and peripheral pulses strong  ABDOMEN: soft, nontender, no hepatosplenomegaly, no masses and bowel sounds normal  MS: no gross musculoskeletal defects noted, no edema  SKIN: no suspicious lesions or rashes  NEURO: Normal strength and tone, mentation intact and speech normal  PSYCH: mentation appears normal, affect normal/bright       ASSESSMENT / PLAN:       ICD-10-CM    1. Encounter for routine adult health examination " "with abnormal findings Z00.01 CBC with platelets differential   2. Screening for abdominal aortic aneurysm Z13.6 US abdominal aorta limited   3. Essential hypertension with goal blood pressure less than 140/90 I10 lisinopril (PRINIVIL/ZESTRIL) 20 MG tablet     Basic metabolic panel   4. Hyperlipidemia LDL goal <130 E78.5 simvastatin (ZOCOR) 20 MG tablet     Lipid panel reflex to direct LDL Fasting     ALT     AST     CK total   5. Need for shingles vaccine Z23 zoster vaccine recombinant adjuvanted (SHINGRIX) injection     Patient will be checking with his insurance company regarding the shingles vaccine and also whether they will cover his ultrasound of his aorta.  He has a history of smoking in the past he quit but has never had his ultrasound done urine that was ordered last year.  Blood pressure is borderline, but apparently is better when he is at home.  Would not change his dose of the medication at this point.    Recheck blood pressure in 1 year.  Refills given for that period of time.    End of Life Planning:  Patient currently has an advanced directive: No.  I have verified the patient's ablity to prepare an advanced directive/make health care decisions.  Literature was provided to assist patient in preparing an advanced directive.    COUNSELING:  Reviewed preventive health counseling, as reflected in patient instructions       Regular exercise       Healthy diet/nutrition    BP Readings from Last 1 Encounters:   09/15/17 139/89     Estimated body mass index is 34.81 kg/(m^2) as calculated from the following:    Height as of 9/15/17: 5' 4.5\" (1.638 m).    Weight as of 9/15/17: 206 lb (93.4 kg).      Weight management plan: Discussed healthy diet and exercise guidelines and patient will follow up in 12 months in clinic to re-evaluate.     reports that he quit smoking about 24 years ago. He has never used smokeless tobacco.      Appropriate preventive services were discussed with this patient, including " applicable screening as appropriate for cardiovascular disease, diabetes, osteopenia/osteoporosis, and glaucoma.  As appropriate for age/gender, discussed screening for colorectal cancer, prostate cancer, breast cancer, and cervical cancer. Checklist reviewing preventive services available has been given to the patient.    Reviewed patients plan of care and provided an AVS. The Basic Care Plan (routine screening as documented in Health Maintenance) for Lico meets the Care Plan requirement. This Care Plan has been established and reviewed with the Patient.    Counseling Resources:  ATP IV Guidelines  Pooled Cohorts Equation Calculator  Breast Cancer Risk Calculator  FRAX Risk Assessment  ICSI Preventive Guidelines  Dietary Guidelines for Americans, 2010  DTT's MyPlate  ASA Prophylaxis  Lung CA Screening    Eriberto Payne MD  [facility]  Answers for HPI/ROS submitted by the patient on 9/11/2018   PHQ-2 Score: 0

## 2018-09-14 NOTE — MR AVS SNAPSHOT
After Visit Summary   9/14/2018    Lico Osullivan    MRN: [id number]           Patient Information     Date Of Birth          1950        Visit Information        Provider Department      9/14/2018 9:00 AM Eriberto Payne MD [facility]        Today's Diagnoses     Encounter for routine adult health examination with abnormal findings    -  1    Screening for abdominal aortic aneurysm        Essential hypertension with goal blood pressure less than 140/90        Hyperlipidemia LDL goal <130        Need for shingles vaccine           Follow-ups after your visit        Your next 10 appointments already scheduled     Nov 15, 2018  9:00 AM CST   New Visit with Glynn Neal MD   [facility]    [address] [facility] [facility] [address] [phone]              Who to contact     If you have questions or need follow up information about today's clinic visit or your schedule please contact [facility] directly at [phone].  Normal or non-critical lab and imaging results will be communicated to you by [employer]hart, letter or phone within 4 business days after the clinic has received the results. If you do not hear from us within 7 days, please contact the clinic through [employer] or phone. If you have a critical or abnormal lab result, we will notify you by phone as soon as possible.  Submit refill requests through Scandid or call your pharmacy and they will forward the refill request to us. Please allow 3 business days for your refill to be completed.          Additional Information About Your Visit        MyChart Information     Scandid gives you secure access to your electronic health record. If you see a primary care provider, you can also send messages to your care team and make appointments. If you have questions, please call your primary care clinic.  If you do not have a primary care provider, please call  "[phone] and they will assist you.        Care EveryWhere ID     This is your Care EveryWhere ID. This could be used by other organizations to access your [facility] medical records  [id number]        Your Vitals Were     Pulse Temperature Height BMI (Body Mass Index)          62 97.6  F (36.4  C) (Oral) 5' 4.5\" (1.638 m) 36.5 kg/m2         Blood Pressure from Last 3 Encounters:   09/14/18 (!) 132/91   09/15/17 139/89   09/26/16 159/90    Weight from Last 3 Encounters:   09/14/18 216 lb (98 kg)   09/15/17 206 lb (93.4 kg)   09/26/16 208 lb (94.3 kg)              We Performed the Following     ALT     AST     Basic metabolic panel     CBC with platelets differential     CK total     Lipid panel reflex to direct LDL Fasting     US abdominal aorta limited          Today's Medication Changes          These changes are accurate as of 9/14/18  9:55 AM.  If you have any questions, ask your nurse or doctor.               Start taking these medicines.        Dose/Directions    zoster vaccine recombinant adjuvanted injection   Commonly known as:  SHINGRIX   Used for:  Need for shingles vaccine   Started by:  Eriberto Payne MD        Dose:  1 each   Inject 0.5 mLs into the muscle once for 1 dose   Quantity:  0.5 mL   Refills:  1            Where to get your medicines      These medications were sent to [facility] [address] [facility] [address]  [address] [facility] [address]     Phone:  [phone]     lisinopril 20 MG tablet    simvastatin 20 MG tablet         Some of these will need a paper prescription and others can be bought over the counter.  Ask your nurse if you have questions.     Bring a paper prescription for each of these medications     zoster vaccine recombinant adjuvanted injection                Primary Care Provider Office Phone # Fax #    Eriberto Payne [phone] [address] [facility] " [address]        Equal Access to Services     [facility]: Hadii aad ku hadfanybrad Dominiqueangela, wamonada luqadaha, qaybta jesusluigimagui miles. So [facility] [phone].    ATENCIÓN: Si habla español, tiene a lambert disposición servicios gratuitos de asistencia lingüística. Llame al [phone].    We comply with applicable federal civil rights laws and [place] laws. We do not discriminate on the basis of race, color, national origin, age, disability, sex, sexual orientation, or gender identity.            Thank you!     Thank you for choosing [facility]  for your care. Our goal is always to provide you with excellent care. Hearing back from our patients is one way we can continue to improve our services. Please take a few minutes to complete the written survey that you may receive in the mail after your visit with us. Thank you!             Your Updated Medication List - Protect others around you: Learn how to safely use, store and throw away your medicines at www.disposemymeds.org.          This list is accurate as of 9/14/18  9:55 AM.  Always use your most recent med list.                   Brand Name Dispense Instructions for use Diagnosis    aspirin 325 MG EC tablet      Take  by mouth daily. 1 tablet daily    Routine general medical examination at a health care facility       lisinopril 20 MG tablet    PRINIVIL/ZESTRIL    90 tablet    Take 1 tablet (20 mg) by mouth daily    Essential hypertension with goal blood pressure less than 140/90       simvastatin 20 MG tablet    ZOCOR    90 tablet    Take 1 tablet (20 mg) by mouth At Bedtime    Hyperlipidemia LDL goal <130       TYLENOL PO      As needed        zoster vaccine recombinant adjuvanted injection    SHINGRIX    0.5 mL    Inject 0.5 mLs into the muscle once for 1 dose    Need for shingles vaccine

## 2018-09-21 NOTE — PROGRESS NOTES
Your liver tests and muscle enzyme tests are normal   Your complete blood count is normal.  Cholesterol tests are basically within normal limits    Your electrolytes and kidney function tests are normal.  Glucose is in the mildly elevated range.    Recheck in 1 year

## 2018-11-15 ENCOUNTER — OFFICE VISIT (OUTPATIENT)
Dept: OPHTHALMOLOGY | Facility: CLINIC | Age: 68
End: 2018-11-15
Payer: MEDICARE

## 2018-11-15 DIAGNOSIS — Z96.1 PSEUDOPHAKIA: Primary | ICD-10-CM

## 2018-11-15 DIAGNOSIS — H43.399 VITREOUS OPACITIES: ICD-10-CM

## 2018-11-15 DIAGNOSIS — H52.4 PRESBYOPIA: ICD-10-CM

## 2018-11-15 DIAGNOSIS — H35.62 RETINAL HEMORRHAGE OF LEFT EYE: ICD-10-CM

## 2018-11-15 PROCEDURE — 92014 COMPRE OPH EXAM EST PT 1/>: CPT | Performed by: OPHTHALMOLOGY

## 2018-11-15 PROCEDURE — 92015 DETERMINE REFRACTIVE STATE: CPT | Mod: GY | Performed by: OPHTHALMOLOGY

## 2018-11-15 ASSESSMENT — EXTERNAL EXAM - LEFT EYE: OS_EXAM: NORMAL

## 2018-11-15 ASSESSMENT — REFRACTION_MANIFEST
OD_CYLINDER: +0.25
OS_AXIS: 010
OS_CYLINDER: +0.25
OS_SPHERE: -1.00
OD_AXIS: 180
OD_SPHERE: -0.75
OD_ADD: +2.75
OS_ADD: +2.75

## 2018-11-15 ASSESSMENT — REFRACTION_WEARINGRX
OD_CYLINDER: +0.25
SPECS_TYPE: PAL
OS_ADD: +2.75
OS_CYLINDER: +0.75
OD_SPHERE: -0.75
OD_ADD: +2.75
OD_AXIS: 176
OS_AXIS: 012
OS_SPHERE: -1.25

## 2018-11-15 ASSESSMENT — SLIT LAMP EXAM - LIDS: COMMENTS: 1+ DERMATOCHALASIS - UPPER LID, 1+ SCLERAL SHOW, 1+ MEIBOMIAN GLAND DYSFUNCTION

## 2018-11-15 ASSESSMENT — TONOMETRY
OS_IOP_MMHG: 21
IOP_METHOD: APPLANATION
OD_IOP_MMHG: 22

## 2018-11-15 ASSESSMENT — CUP TO DISC RATIO
OS_RATIO: 0.5
OD_RATIO: 0.3

## 2018-11-15 ASSESSMENT — VISUAL ACUITY
METHOD: SNELLEN - LINEAR
OD_CC+: -1
OD_CC: 20/20
CORRECTION_TYPE: GLASSES
OS_CC: 20/20

## 2018-11-15 ASSESSMENT — CONF VISUAL FIELD
OD_NORMAL: 1
OS_NORMAL: 1

## 2018-11-15 NOTE — MR AVS SNAPSHOT
After Visit Summary   11/15/2018    Lico Osullivan    MRN: [id number]           Patient Information     Date Of Birth          1950        Visit Information        Provider Department      11/15/2018 9:00 AM Glynn Neal MD [facility]        Today's Diagnoses     Pseuophakia, Yag Caps, ou    -  1    Presbyopia        Hx of retinal hemorrhage of left eye, remotely        Vitreous condensations, os          Care Instructions    Glasses Rx given - optional.  Use artificial tears up to 4 times daily both eyes.  (Refresh Tears, Systane Ultra/Balance, or Theratears)  Possible posterior vitreous detachment (sudden onset large floater and/or flashing lights) both eyes discussed.  Call in July 2019 for an appointment in November 2019 for Complete Exam.    Dr. Neal [phone]            Follow-ups after your visit        Who to contact     If you have questions or need follow up information about today's clinic visit or your schedule please contact [facility] directly at [phone].  Normal or non-critical lab and imaging results will be communicated to you by Reesiohart, letter or phone within 4 business days after the clinic has received the results. If you do not hear from us within 7 days, please contact the clinic through Publish2t or phone. If you have a critical or abnormal lab result, we will notify you by phone as soon as possible.  Submit refill requests through [employer] or call your pharmacy and they will forward the refill request to us. Please allow 3 business days for your refill to be completed.          Additional Information About Your Visit        Reesiohart Information     [employer] gives you secure access to your electronic health record. If you see a primary care provider, you can also send messages to your care team and make appointments. If you have questions, please call your primary care clinic.  If you do not have a primary care provider, please call  [phone] and they will assist you.        Care EveryWhere ID     This is your Care EveryWhere ID. This could be used by other organizations to access your [facility] medical records  [id number]         Blood Pressure from Last 3 Encounters:   09/14/18 (!) 132/91   09/15/17 139/89   09/26/16 159/90    Weight from Last 3 Encounters:   09/14/18 98 kg (216 lb)   09/15/17 93.4 kg (206 lb)   09/26/16 94.3 kg (208 lb)              Today, you had the following     No orders found for display       Primary Care Provider Office Phone # Fax #    Eriberto Payne [phone]       [address] [facility] [address]        Equal Access to Services     PASCUAL MARCELINO : Hadii aad ku hadasho Soomaali, waaxda luqadaha, qaybta kaalmada adeegyada, magui kirkland . So [facility] [phone].    ATENCIÓN: Si habla español, tiene a lambert disposición servicios gratuitos de asistencia lingüística. Llame al [phone].    We comply with applicable federal civil rights laws and [place] laws. We do not discriminate on the basis of race, color, national origin, age, disability, sex, sexual orientation, or gender identity.            Thank you!     Thank you for choosing [facility]  for your care. Our goal is always to provide you with excellent care. Hearing back from our patients is one way we can continue to improve our services. Please take a few minutes to complete the written survey that you may receive in the mail after your visit with us. Thank you!             Your Updated Medication List - Protect others around you: Learn how to safely use, store and throw away your medicines at www.disposemymeds.org.          This list is accurate as of 11/15/18 10:00 AM.  Always use your most recent med list.                   Brand Name Dispense Instructions for use Diagnosis    aspirin 325 MG EC tablet      Take  by mouth daily. 1 tablet daily    Routine general medical  examination at a health care facility       lisinopril 20 MG tablet    PRINIVIL/ZESTRIL    90 tablet    Take 1 tablet (20 mg) by mouth daily    Essential hypertension with goal blood pressure less than 140/90       simvastatin 20 MG tablet    ZOCOR    90 tablet    Take 1 tablet (20 mg) by mouth At Bedtime    Hyperlipidemia LDL goal <130       TYLENOL PO      As needed

## 2018-11-15 NOTE — LETTER
11/15/2018         RE: Lico Osullivan  [address] [facility] [address]        Dear Colleague,    Thank you for referring your patient, Lico Osullivan, to the [facility]. Please see a copy of my visit note below.     Current Eye Medications:  Art. Tears daily both eyes     Subjective:  Here for complete today.  Feels eyes are dry. Also chose the bigger frames, doesn't like seeing the edge of the frames.  He has a couple spots on his temples (moles) and on the right lower eyelid.  Says no history of skin cancers and haven't changed in size, but just to check on them.      Objective:  See Ophthalmology Exam.       Assessment:  Stable eye exam.      ICD-10-CM    1. Pseuophakia, Yag Caps, ou Z96.1 EYE EXAM (SIMPLE-NONBILLABLE)   2. Hx of retinal hemorrhage of left eye, remotely H35.62    3. Vitreous condensations, os H43.399    4. Presbyopia H52.4 REFRACTIVE STATUS        Plan:  Glasses Rx given - optional.  Use artificial tears up to 4 times daily both eyes.  (Refresh Tears, Systane Ultra/Balance, or Theratears)  Possible posterior vitreous detachment (sudden onset large floater and/or flashing lights) both eyes discussed.  Call in July 2019 for an appointment in November 2019 for Complete Exam.    Dr. Neal [phone]           Again, thank you for allowing me to participate in the care of your patient.        Sincerely,        Glynn Neal MD

## 2018-11-15 NOTE — PROGRESS NOTES
Current Eye Medications:  Art. Tears daily both eyes     Subjective:  Here for complete today.  Feels eyes are dry. Also chose the bigger frames, doesn't like seeing the edge of the frames.  He has a couple spots on his temples (moles) and on the right lower eyelid.  Says no history of skin cancers and haven't changed in size, but just to check on them.      Objective:  See Ophthalmology Exam.       Assessment:  Stable eye exam.      ICD-10-CM    1. Pseuophakia, Yag Caps, ou Z96.1 EYE EXAM (SIMPLE-NONBILLABLE)   2. Hx of retinal hemorrhage of left eye, remotely H35.62    3. Vitreous condensations, os H43.399    4. Presbyopia H52.4 REFRACTIVE STATUS        Plan:  Glasses Rx given - optional.  Use artificial tears up to 4 times daily both eyes.  (Refresh Tears, Systane Ultra/Balance, or Theratears)  Possible posterior vitreous detachment (sudden onset large floater and/or flashing lights) both eyes discussed.  Call in July 2019 for an appointment in November 2019 for Complete Exam.    Dr. Neal [phone]

## 2018-11-15 NOTE — PATIENT INSTRUCTIONS
Glasses Rx given - optional.  Use artificial tears up to 4 times daily both eyes.  (Refresh Tears, Systane Ultra/Balance, or Theratears)  Possible posterior vitreous detachment (sudden onset large floater and/or flashing lights) both eyes discussed.  Call in July 2019 for an appointment in November 2019 for Complete Exam.    Dr. Neal [phone]

## 2019-02-28 ENCOUNTER — DOCUMENTATION ONLY (OUTPATIENT)
Dept: OPHTHALMOLOGY | Facility: CLINIC | Age: 69
End: 2019-02-28

## 2019-08-29 DIAGNOSIS — I10 ESSENTIAL HYPERTENSION WITH GOAL BLOOD PRESSURE LESS THAN 140/90: ICD-10-CM

## 2019-08-29 DIAGNOSIS — E78.5 HYPERLIPIDEMIA LDL GOAL <130: ICD-10-CM

## 2019-08-29 RX ORDER — SIMVASTATIN 20 MG
TABLET ORAL
Qty: 30 TABLET | Refills: 0 | Status: SHIPPED | OUTPATIENT
Start: 2019-08-29 | End: 2019-08-29

## 2019-08-29 RX ORDER — LISINOPRIL 20 MG/1
TABLET ORAL
Qty: 30 TABLET | Refills: 0 | Status: SHIPPED | OUTPATIENT
Start: 2019-08-29 | End: 2019-08-29

## 2019-08-29 NOTE — TELEPHONE ENCOUNTER
"Requested Prescriptions   Pending Prescriptions Disp Refills     lisinopril (PRINIVIL/ZESTRIL) 20 MG tablet [Pharmacy Med Name: LISINOPRIL 20MG TABLETS] 90 tablet 0     Sig: TAKE 1 TABLET(20 MG) BY MOUTH DAILY   Last Written Prescription Date:  9-14-18  Last Fill Quantity: 90,  # refills: 3   Last office visit: 9/14/2018 with prescribing provider:  9-14-18   Future Office Visit:        ACE Inhibitors (Including Combos) Protocol Failed - 8/29/2019  5:12 AM        Failed - Blood pressure under 140/90 in past 12 months     BP Readings from Last 3 Encounters:   09/14/18 (!) 132/91   09/15/17 139/89   09/26/16 159/90                 Passed - Recent (12 mo) or future (30 days) visit within the authorizing provider's specialty     Patient had office visit in the last 12 months or has a visit in the next 30 days with authorizing provider or within the authorizing provider's specialty.  See \"Patient Info\" tab in inbasket, or \"Choose Columns\" in Meds & Orders section of the refill encounter.              Passed - Medication is active on med list        Passed - Patient is age 18 or older        Passed - Normal serum creatinine on file in past 12 months     Recent Labs   Lab Test 09/14/18  1014   CR 0.88             Passed - Normal serum potassium on file in past 12 months     Recent Labs   Lab Test 09/14/18  1014   POTASSIUM 4.5             simvastatin (ZOCOR) 20 MG tablet [Pharmacy Med Name: SIMVASTATIN 20MG TABLETS] 90 tablet 0     Sig: TAKE 1 TABLET(20 MG) BY MOUTH AT BEDTIME   Last Written Prescription Date:  9-14-18  Last Fill Quantity: 90,  # refills: 3   Last office visit: 9/14/2018 with prescribing provider:  9-14-18   Future Office Visit:        Statins Protocol Passed - 8/29/2019  5:12 AM        Passed - LDL on file in past 12 months     Recent Labs   Lab Test 09/14/18  1014   *             Passed - No abnormal creatine kinase in past 12 months     Recent Labs   Lab Test 09/14/18  1014   CKT 78                " "Passed - Recent (12 mo) or future (30 days) visit within the authorizing provider's specialty     Patient had office visit in the last 12 months or has a visit in the next 30 days with authorizing provider or within the authorizing provider's specialty.  See \"Patient Info\" tab in inbasket, or \"Choose Columns\" in Meds & Orders section of the refill encounter.              Passed - Medication is active on med list        Passed - Patient is age 18 or older          "

## 2019-08-29 NOTE — TELEPHONE ENCOUNTER
Routing refill request to provider for review/approval because:  Failed protocol: blood pressure. Due to be seen next month. 30 day supply cued for provider approval.     Noemi Wilson RN

## 2019-08-30 NOTE — TELEPHONE ENCOUNTER
Routed to team to reach out to patient to schedule.    Kori Mix RN  [facility]

## 2019-09-04 RX ORDER — LISINOPRIL 20 MG/1
TABLET ORAL
Qty: 90 TABLET | Refills: 0 | Status: SHIPPED | OUTPATIENT
Start: 2019-09-04 | End: 2019-10-08

## 2019-09-04 RX ORDER — SIMVASTATIN 20 MG
TABLET ORAL
Qty: 90 TABLET | Refills: 0 | Status: SHIPPED | OUTPATIENT
Start: 2019-09-04 | End: 2020-09-17

## 2019-09-04 NOTE — TELEPHONE ENCOUNTER
"Requested Prescriptions   Pending Prescriptions Disp Refills     simvastatin (ZOCOR) 20 MG tablet [Pharmacy Med Name: SIMVASTATIN 20MG TABLETS] 90 tablet 0     Sig: TAKE 1 TABLET BY MOUTH AT BEDTIME       Statins Protocol Passed - 8/29/2019  8:10 PM        Passed - LDL on file in past 12 months     Recent Labs   Lab Test 09/14/18  1014   *             Passed - No abnormal creatine kinase in past 12 months     Recent Labs   Lab Test 09/14/18  1014   CKT 78                Passed - Recent (12 mo) or future (30 days) visit within the authorizing provider's specialty     Patient had office visit in the last 12 months or has a visit in the next 30 days with authorizing provider or within the authorizing provider's specialty.  See \"Patient Info\" tab in inbasket, or \"Choose Columns\" in Meds & Orders section of the refill encounter.              Passed - Medication is active on med list        Passed - Patient is age 18 or older        lisinopril (PRINIVIL/ZESTRIL) 20 MG tablet [Pharmacy Med Name: LISINOPRIL 20MG TABLETS] 90 tablet 0     Sig: TAKE 1 TABLET BY MOUTH EVERY DAY       ACE Inhibitors (Including Combos) Protocol Failed - 8/29/2019  8:10 PM        Failed - Blood pressure under 140/90 in past 12 months     BP Readings from Last 3 Encounters:   09/14/18 (!) 132/91   09/15/17 139/89   09/26/16 159/90                 Passed - Recent (12 mo) or future (30 days) visit within the authorizing provider's specialty     Patient had office visit in the last 12 months or has a visit in the next 30 days with authorizing provider or within the authorizing provider's specialty.  See \"Patient Info\" tab in inbasket, or \"Choose Columns\" in Meds & Orders section of the refill encounter.              Passed - Medication is active on med list        Passed - Patient is age 18 or older        Passed - Normal serum creatinine on file in past 12 months     Recent Labs   Lab Test 09/14/18  1014   CR 0.88             Passed - Normal " serum potassium on file in past 12 months     Recent Labs   Lab Test 09/14/18  1014   POTASSIUM 4.5

## 2019-09-14 ASSESSMENT — ENCOUNTER SYMPTOMS
COUGH: 0
FREQUENCY: 0
HEMATURIA: 0
SHORTNESS OF BREATH: 0
DYSURIA: 0
WEAKNESS: 0
PALPITATIONS: 0
NAUSEA: 0
FEVER: 0
DIZZINESS: 0
ABDOMINAL PAIN: 0
NERVOUS/ANXIOUS: 0
EYE PAIN: 0
CHILLS: 0
JOINT SWELLING: 0
HEMATOCHEZIA: 0
PARESTHESIAS: 0
HEARTBURN: 0
CONSTIPATION: 0
DIARRHEA: 0
HEADACHES: 0
ARTHRALGIAS: 0
MYALGIAS: 0
SORE THROAT: 0

## 2019-09-14 ASSESSMENT — ACTIVITIES OF DAILY LIVING (ADL): CURRENT_FUNCTION: NO ASSISTANCE NEEDED

## 2019-09-16 ENCOUNTER — OFFICE VISIT (OUTPATIENT)
Dept: FAMILY MEDICINE | Facility: CLINIC | Age: 69
End: 2019-09-16
Payer: MEDICARE

## 2019-09-16 VITALS
WEIGHT: 219.75 LBS | DIASTOLIC BLOOD PRESSURE: 96 MMHG | TEMPERATURE: 97.4 F | SYSTOLIC BLOOD PRESSURE: 150 MMHG | BODY MASS INDEX: 37.52 KG/M2 | HEIGHT: 64 IN

## 2019-09-16 DIAGNOSIS — I10 HYPERTENSION GOAL BP (BLOOD PRESSURE) < 140/90: ICD-10-CM

## 2019-09-16 DIAGNOSIS — E78.5 HYPERLIPIDEMIA LDL GOAL <130: ICD-10-CM

## 2019-09-16 DIAGNOSIS — Z00.00 ENCOUNTER FOR MEDICARE ANNUAL WELLNESS EXAM: Primary | ICD-10-CM

## 2019-09-16 DIAGNOSIS — C61 PROSTATE CA (H): ICD-10-CM

## 2019-09-16 DIAGNOSIS — L82.1 SEBORRHEIC KERATOSIS: ICD-10-CM

## 2019-09-16 DIAGNOSIS — I10 ESSENTIAL HYPERTENSION WITH GOAL BLOOD PRESSURE LESS THAN 140/90: ICD-10-CM

## 2019-09-16 LAB
ALT SERPL W P-5'-P-CCNC: 41 U/L (ref 0–70)
ANION GAP SERPL CALCULATED.3IONS-SCNC: 6 MMOL/L (ref 3–14)
AST SERPL W P-5'-P-CCNC: 27 U/L (ref 0–45)
BASOPHILS # BLD AUTO: 0 10E9/L (ref 0–0.2)
BASOPHILS NFR BLD AUTO: 0.3 %
BUN SERPL-MCNC: 18 MG/DL (ref 7–30)
CALCIUM SERPL-MCNC: 9.3 MG/DL (ref 8.5–10.1)
CHLORIDE SERPL-SCNC: 108 MMOL/L (ref 94–109)
CHOLEST SERPL-MCNC: 173 MG/DL
CO2 SERPL-SCNC: 29 MMOL/L (ref 20–32)
CREAT SERPL-MCNC: 0.92 MG/DL (ref 0.66–1.25)
DIFFERENTIAL METHOD BLD: NORMAL
EOSINOPHIL # BLD AUTO: 0.2 10E9/L (ref 0–0.7)
EOSINOPHIL NFR BLD AUTO: 2.1 %
ERYTHROCYTE [DISTWIDTH] IN BLOOD BY AUTOMATED COUNT: 13.6 % (ref 10–15)
GFR SERPL CREATININE-BSD FRML MDRD: 84 ML/MIN/{1.73_M2}
GLUCOSE SERPL-MCNC: 110 MG/DL (ref 70–99)
HCT VFR BLD AUTO: 45.9 % (ref 40–53)
HDLC SERPL-MCNC: 66 MG/DL
HGB BLD-MCNC: 15.8 G/DL (ref 13.3–17.7)
LDLC SERPL CALC-MCNC: 84 MG/DL
LYMPHOCYTES # BLD AUTO: 1.6 10E9/L (ref 0.8–5.3)
LYMPHOCYTES NFR BLD AUTO: 21 %
MCH RBC QN AUTO: 32.8 PG (ref 26.5–33)
MCHC RBC AUTO-ENTMCNC: 34.4 G/DL (ref 31.5–36.5)
MCV RBC AUTO: 95 FL (ref 78–100)
MONOCYTES # BLD AUTO: 0.8 10E9/L (ref 0–1.3)
MONOCYTES NFR BLD AUTO: 10.4 %
NEUTROPHILS # BLD AUTO: 4.9 10E9/L (ref 1.6–8.3)
NEUTROPHILS NFR BLD AUTO: 66.2 %
NONHDLC SERPL-MCNC: 107 MG/DL
PLATELET # BLD AUTO: 186 10E9/L (ref 150–450)
POTASSIUM SERPL-SCNC: 4.7 MMOL/L (ref 3.4–5.3)
PSA SERPL-MCNC: 0.03 UG/L (ref 0–4)
RBC # BLD AUTO: 4.81 10E12/L (ref 4.4–5.9)
SODIUM SERPL-SCNC: 143 MMOL/L (ref 133–144)
TRIGL SERPL-MCNC: 116 MG/DL
WBC # BLD AUTO: 7.5 10E9/L (ref 4–11)

## 2019-09-16 PROCEDURE — 80061 LIPID PANEL: CPT | Performed by: FAMILY MEDICINE

## 2019-09-16 PROCEDURE — 36415 COLL VENOUS BLD VENIPUNCTURE: CPT | Performed by: FAMILY MEDICINE

## 2019-09-16 PROCEDURE — 84460 ALANINE AMINO (ALT) (SGPT): CPT | Performed by: FAMILY MEDICINE

## 2019-09-16 PROCEDURE — 84450 TRANSFERASE (AST) (SGOT): CPT | Performed by: FAMILY MEDICINE

## 2019-09-16 PROCEDURE — 84153 ASSAY OF PSA TOTAL: CPT | Performed by: FAMILY MEDICINE

## 2019-09-16 PROCEDURE — 80048 BASIC METABOLIC PNL TOTAL CA: CPT | Performed by: FAMILY MEDICINE

## 2019-09-16 PROCEDURE — G0439 PPPS, SUBSEQ VISIT: HCPCS | Performed by: FAMILY MEDICINE

## 2019-09-16 PROCEDURE — 85025 COMPLETE CBC W/AUTO DIFF WBC: CPT | Performed by: FAMILY MEDICINE

## 2019-09-16 RX ORDER — HYDROCHLOROTHIAZIDE 12.5 MG/1
25 TABLET ORAL DAILY
Qty: 30 TABLET | Refills: 0 | Status: SHIPPED | OUTPATIENT
Start: 2019-09-16 | End: 2019-09-16

## 2019-09-16 ASSESSMENT — ENCOUNTER SYMPTOMS
SORE THROAT: 0
CONSTIPATION: 0
FEVER: 0
DIZZINESS: 0
HEARTBURN: 0
HEMATOCHEZIA: 0
MYALGIAS: 0
NAUSEA: 0
CHILLS: 0
ARTHRALGIAS: 0
JOINT SWELLING: 0
DIARRHEA: 0
PARESTHESIAS: 0
ABDOMINAL PAIN: 0
NERVOUS/ANXIOUS: 0
WEAKNESS: 0
FREQUENCY: 0
COUGH: 0
HEMATURIA: 0
HEADACHES: 0
EYE PAIN: 0
SHORTNESS OF BREATH: 0
DYSURIA: 0
PALPITATIONS: 0

## 2019-09-16 ASSESSMENT — ACTIVITIES OF DAILY LIVING (ADL): CURRENT_FUNCTION: NO ASSISTANCE NEEDED

## 2019-09-16 ASSESSMENT — MIFFLIN-ST. JEOR: SCORE: 1680.53

## 2019-09-16 NOTE — PROGRESS NOTES
"SUBJECTIVE:   Lico Osullivan is a 69 year old male who presents for Preventive Visit.    Healthy Habits:     In general, how would you rate your overall health?  Fair    Frequency of exercise:  None    Do you usually eat at least 4 servings of fruit and vegetables a day, include whole grains    & fiber and avoid regularly eating high fat or \"junk\" foods?  Yes    Taking medications regularly:  Yes    Medication side effects:  None    Ability to successfully perform activities of daily living:  No assistance needed    Home Safety:  No safety concerns identified    Hearing Impairment:  No hearing concerns    In the past 6 months, have you been bothered by leaking of urine?  No    In general, how would you rate your overall mental or emotional health?  Good      PHQ-2 Total Score: 0    Additional concerns today:  Yes    Do you feel safe in your environment? Yes    Do you have a Health Care Directive? No: Advance care planning reviewed with patient; information given to patient to review.      Hearing acuity tested   See nursing notes for values     Hearing Acuity: REFER recommended ; patient declines     Hearing Assessment: abnormal--refer to audiology     Fall risk  Fallen 2 or more times in the past year?: No  Any fall with injury in the past year?: No    Cognitive Screening   1) Repeat 3 items (Leader, Season, Table)    2) Clock draw: NORMAL  3) 3 item recall: Recalls 2 objects   Results: NORMAL clock, 1-2 items recalled: COGNITIVE IMPAIRMENT LESS LIKELY    Mini-CogTM Copyright BRENDEN Barba. Licensed by the author for use in [facility]; reprinted with permission (alexandro@.[facility]). All rights reserved.      Do you have sleep apnea, excessive snoring or daytime drowsiness?: yes - Sometimes gets drowsiness    Reviewed and updated as needed this visit by clinical staff         Reviewed and updated as needed this visit by Provider        Social History     Tobacco Use     Smoking status: Former Smoker     Last " attempt to quit: 9/10/1994     Years since quittin.0     Smokeless tobacco: Never Used     Tobacco comment: smoke free household.   Substance Use Topics     Alcohol use: Yes     Alcohol/week: 3.6 oz     Comment: occ.       Alcohol Use 2019   Prescreen: >3 drinks/day or >7 drinks/week? Yes   Prescreen: >3 drinks/day or >7 drinks/week? -   AUDIT SCORE  4     AUDIT - Alcohol Use Disorders Identification Test - Reproduced from the World Health Organization Audit 2001 (Second Edition) 2019   1.  How often do you have a drink containing alcohol? 4 or more times a week   2.  How many drinks containing alcohol do you have on a typical day when you are drinking? 1 or 2   3.  How often do you have five or more drinks on one occasion? Never   4.  How often during the last year have you found that you were not able to stop drinking once you had started? Never   5.  How often during the last year have you failed to do what was normally expected of you because of drinking? Never   6.  How often during the last year have you needed a first drink in the morning to get yourself going after a heavy drinking session? Never   7.  How often during the last year have you had a feeling of guilt or remorse after drinking? Never   8.  How often during the last year have you been unable to remember what happened the night before because of your drinking? Never   9.  Have you or someone else been injured because of your drinking? No   10. Has a relative, friend, doctor or other health care worker been concerned about your drinking or suggested you cut down? No   TOTAL SCORE 4         Current providers sharing in care for this patient include:   Patient Care Team:  Eriberto Payne MD as PCP - General  Eriberto Payne MD as Assigned PCP    The following health maintenance items are reviewed in Epic and correct as of today:  Health Maintenance   Topic Date Due     AORTIC ANEURYSM SCREENING (SYSTEM ASSIGNED)  2016      ADVANCE CARE PLANNING  2016     ZOSTER IMMUNIZATION (2 of 3) 2016     INFLUENZA VACCINE (1) 2019     FALL RISK ASSESSMENT  2019     MEDICARE ANNUAL WELLNESS VISIT  2019     EYE EXAM  11/15/2019     COLONOSCOPY  2021     LIPID  2023     DTAP/TDAP/TD IMMUNIZATION (3 - Td) 2026     HEPATITIS C SCREENING  Completed     PHQ-2  Completed     PNEUMOCOCCAL IMMUNIZATION 65+ LOW/MEDIUM RISK  Completed     IPV IMMUNIZATION  Aged Out     MENINGITIS IMMUNIZATION  Aged Out     Labs reviewed in EPIC  BP Readings from Last 3 Encounters:   19 (!) 171/93   18 (!) 132/91   09/15/17 139/89    Wt Readings from Last 3 Encounters:   19 99.7 kg (219 lb 12 oz)   18 98 kg (216 lb)   09/15/17 93.4 kg (206 lb)                  Patient Active Problem List   Diagnosis     Prostate CA (H)     Colon polyp     HYPERLIPIDEMIA LDL GOAL <130     Advanced directives, counseling/discussion     Hypertension goal BP (blood pressure) < 140/90     Vitreous condensations, os     Pseuophakia, Yag Caps, ou     Severe obesity (BMI 35.0-35.9 with comorbidity) (H)     Hx of retinal hemorrhage of left eye, remotely     Past Surgical History:   Procedure Laterality Date     CATARACT IOL, RT/LT       CL AFF SURGICAL PATHOLOGY           COLONOSCOPY  2011    Procedure:COMBINED COLONOSCOPY, REMOVE TUMOR/POLYP/LESION BY SNARE; Surgeon:ALBARO LANGFORD; Location:[facility] OR     LASER YAG CAPSULOTOMY  10/2015; 2016    left eye; right eye     PHACOEMULSIFICATION WITH STANDARD INTRAOCULAR LENS IMPLANT  10/2012; 2014    left eye; right eye     SUPRAPUBIC PROSTATECTOMY         Social History     Tobacco Use     Smoking status: Former Smoker     Last attempt to quit: 9/10/1994     Years since quittin.0     Smokeless tobacco: Never Used     Tobacco comment: smoke free household.   Substance Use Topics     Alcohol use: Yes     Alcohol/week: 3.6 oz     Comment: occ.     Family History    Problem Relation Age of Onset     C.A.D. Father      Psychotic Disorder Father         schizophrenia     Prostate Cancer Father         full surgery     Asthma Other         nephew     Prostate Cancer Paternal Grandfather      Hypertension Sister      Cataracts Sister      Psychotic Disorder Sister         schizophrenia     Prostate Cancer Maternal Grandmother      Prostate Cancer Maternal Grandfather         partial surgery     Asthma Other      Cataracts Mother          Current Outpatient Medications   Medication Sig Dispense Refill     Acetaminophen (TYLENOL PO) As needed       aspirin 325 MG EC tablet Take  by mouth daily. 1 tablet daily         lisinopril (PRINIVIL/ZESTRIL) 20 MG tablet TAKE 1 TABLET BY MOUTH EVERY DAY 90 tablet 0     simvastatin (ZOCOR) 20 MG tablet TAKE 1 TABLET BY MOUTH AT BEDTIME 90 tablet 0     No Known Allergies  Pneumonia Vaccine:Adults age 65+ who received Pneumovax (PPSV23) at 65 years or older: Should be given PCV13 > 1 year after their most recent PPSV23    Review of Systems   Constitutional: Negative for chills and fever.   HENT: Negative for congestion, ear pain, hearing loss and sore throat.    Eyes: Negative for pain and visual disturbance.   Respiratory: Negative for cough and shortness of breath.    Cardiovascular: Negative for chest pain, palpitations and peripheral edema.   Gastrointestinal: Negative for abdominal pain, constipation, diarrhea, heartburn, hematochezia and nausea.   Genitourinary: Negative for discharge, dysuria, frequency, genital sores, hematuria, impotence and urgency.   Musculoskeletal: Negative for arthralgias, joint swelling and myalgias.   Skin: Negative for rash.   Neurological: Negative for dizziness, weakness, headaches and paresthesias.   Psychiatric/Behavioral: Negative for mood changes. The patient is not nervous/anxious.          OBJECTIVE:   BP (!) 171/93 (BP Location: Right arm, Patient Position: Sitting, Cuff Size: Adult Large)   Temp 97.4  " F (36.3  C) (Oral)   Ht 1.63 m (5' 4.17\")   Wt 99.7 kg (219 lb 12 oz)   BMI 37.52 kg/m   Estimated body mass index is 36.5 kg/m  as calculated from the following:    Height as of 9/14/18: 1.638 m (5' 4.5\").    Weight as of 9/14/18: 98 kg (216 lb).  Physical Exam  GENERAL: healthy, alert and no distress  EYES: Eyes grossly normal to inspection, PERRL and conjunctivae and sclerae normal  HENT: ear canals and TM's normal, nose and mouth without ulcers or lesions  NECK: no adenopathy, no asymmetry, masses, or scars and thyroid normal to palpation  RESP: lungs clear to auscultation - no rales, rhonchi or wheezes  CV: regular rate and rhythm, normal S1 S2, no S3 or S4, no murmur, click or rub, no peripheral edema and peripheral pulses strong  ABDOMEN: soft, nontender, no hepatosplenomegaly, no masses and bowel sounds normal  MS: no gross musculoskeletal defects noted, no edema  SKIN: no suspicious lesions or rashes  Seborrheic keratoses on face ; patient is concerned about the one on the left eyelid   NEURO: Normal strength and tone, mentation intact and speech normal  PSYCH: mentation appears normal, affect normal/bright    Diagnostic Test Results:  Labs reviewed in Epic     ASSESSMENT / PLAN:       ICD-10-CM    1. Encounter for Medicare annual wellness exam Z00.00 Basic metabolic panel     CBC with platelets differential   2. Prostate CA (H) C61 PSA, tumor marker   3. Hypertension goal BP (blood pressure) < 140/90 I10 Basic metabolic panel     hydrochlorothiazide (HYDRODIURIL) 12.5 MG tablet   4. Hyperlipidemia LDL goal <130 E78.5 Lipid panel reflex to direct LDL Fasting     ALT     AST   5. Essential hypertension with goal blood pressure less than 140/90 I10    6. Seborrheic keratosis L82.1      We discussed referral to derm to deal with the seb keratosis on the left upper eyelid     End of Life Planning:  Patient currently has an advanced directive: No.  I have verified the patient's ablity to prepare an advanced " "directive/make health care decisions.  Literature was provided to assist patient in preparing an advanced directive.    COUNSELING:  Reviewed preventive health counseling, as reflected in patient instructions       Regular exercise       Healthy diet/nutrition       Vision screening       Hearing screening       Dental care       Colon cancer screening    Estimated body mass index is 36.5 kg/m  as calculated from the following:    Height as of 9/14/18: 1.638 m (5' 4.5\").    Weight as of 9/14/18: 98 kg (216 lb).    Weight management plan: Discussed healthy diet and exercise guidelines     Patient plans on starting NOOM ; a weight loss program      reports that he quit smoking about 25 years ago. He has never used smokeless tobacco.      Appropriate preventive services were discussed with this patient, including applicable screening as appropriate for cardiovascular disease, diabetes, osteopenia/osteoporosis, and glaucoma.  As appropriate for age/gender, discussed screening for colorectal cancer, prostate cancer, breast cancer, and cervical cancer. Checklist reviewing preventive services available has been given to the patient.    Reviewed patients plan of care and provided an AVS. The Basic Care Plan (routine screening as documented in Health Maintenance) for Lico meets the Care Plan requirement. This Care Plan has been established and reviewed with the Patient.    Counseling Resources:  ATP IV Guidelines  Pooled Cohorts Equation Calculator  Breast Cancer Risk Calculator  FRAX Risk Assessment  ICSI Preventive Guidelines  Dietary Guidelines for Americans, 2010  USDA's MyPlate  ASA Prophylaxis  Lung CA Screening    Eriberto Payne MD  [facility]    Identified Health Risks:  "

## 2019-09-16 NOTE — PATIENT INSTRUCTIONS
Patient Education   Personalized Prevention Plan  You are due for the preventive services outlined below.  Your care team is available to assist you in scheduling these services.  If you have already completed any of these items, please share that information with your care team to update in your medical record.  Health Maintenance Due   Topic Date Due     AORTIC ANEURYSM SCREENING (SYSTEM ASSIGNED)  06/05/2016     Discuss Advance Care Planning  07/07/2016     Zoster (Shingles) Vaccine (2 of 3) 11/20/2016     Flu Vaccine (1) 09/01/2019     FALL RISK ASSESSMENT  09/14/2019     Annual Wellness Visit  09/14/2019

## 2019-09-16 NOTE — NURSING NOTE
HEARING FREQUENCY    Right Ear:        500 Hz: RESPONSE- on Level: 30 db   1000 Hz: RESPONSE- on Level: 25 db   2000 Hz: RESPONSE- on Level: 30 db   4000 Hz: RESPONSE- on Level: 45 db   6000 Hz: RESPONSE- on Level: 65 db    Left Ear:     6000 Hz: RESPONSE- on Level: 70 db   4000 Hz: RESPONSE- on Level: 55 db   2000 Hz: RESPONSE- on Level: 40 db   1000 Hz: RESPONSE- on Level: 30 db     500 Hz: RESPONSE- on Level: 30 db      [facility]

## 2019-09-16 NOTE — TELEPHONE ENCOUNTER
"Requested Prescriptions   Pending Prescriptions Disp Refills     hydrochlorothiazide (HYDRODIURIL) 12.5 MG tablet [Pharmacy Med Name: HYDROCHLOROTHIAZIDE 12.5MG TABLETS]  Last Written Prescription Date:  9/16/19  Last Fill Quantity: 30,  # refills: 0   Last office visit: No previous visit found with prescribing provider:  Elmer   Future Office Visit:   Next 5 appointments (look out 90 days)    Oct 08, 2019  9:20 AM CDT  SHORT with Eriberto Payne MD  [facility] ([facility]) [address] [id number] [phone]          180 tablet 0     Sig: TAKE 2 TABLETS(25 MG) BY MOUTH DAILY       Diuretics (Including Combos) Protocol Failed - 9/16/2019  8:59 AM        Failed - Blood pressure under 140/90 in past 12 months     BP Readings from Last 3 Encounters:   09/16/19 (!) 150/96   09/14/18 (!) 132/91   09/15/17 139/89                 Failed - Normal serum creatinine on file in past 12 months     Recent Labs   Lab Test 09/14/18  1014   CR 0.88              Failed - Normal serum potassium on file in past 12 months     Recent Labs   Lab Test 09/14/18  1014   POTASSIUM 4.5                    Failed - Normal serum sodium on file in past 12 months     Recent Labs   Lab Test 09/14/18  1014                 Passed - Recent (12 mo) or future (30 days) visit within the authorizing provider's specialty     Patient had office visit in the last 12 months or has a visit in the next 30 days with authorizing provider or within the authorizing provider's specialty.  See \"Patient Info\" tab in inbasket, or \"Choose Columns\" in Meds & Orders section of the refill encounter.              Passed - Medication is active on med list        Passed - Patient is age 18 or older          "

## 2019-09-18 RX ORDER — HYDROCHLOROTHIAZIDE 12.5 MG/1
TABLET ORAL
Qty: 180 TABLET | Refills: 0 | Status: SHIPPED | OUTPATIENT
Start: 2019-09-18 | End: 2019-10-08

## 2019-09-18 NOTE — RESULT ENCOUNTER NOTE
Your psa remains low  I would repet this in 6 months since it appears to be trending up slightly, probably still in good range.    Complete blood count is normal   Cholesterols are normal   Liver tests are normal   Your basic metabolic panel which includes electrolytes,kidney function is normal and  -Glucose (diabetic screening test) is elevated slightly       Follow up in 6 month(s)

## 2019-09-28 DIAGNOSIS — E78.5 HYPERLIPIDEMIA LDL GOAL <130: ICD-10-CM

## 2019-09-28 DIAGNOSIS — I10 ESSENTIAL HYPERTENSION WITH GOAL BLOOD PRESSURE LESS THAN 140/90: ICD-10-CM

## 2019-09-30 RX ORDER — SIMVASTATIN 20 MG
TABLET ORAL
Qty: 30 TABLET | Refills: 0 | OUTPATIENT
Start: 2019-09-30

## 2019-09-30 RX ORDER — LISINOPRIL 20 MG/1
TABLET ORAL
Qty: 30 TABLET | Refills: 0 | OUTPATIENT
Start: 2019-09-30

## 2019-09-30 NOTE — TELEPHONE ENCOUNTER
"Requested Prescriptions   Pending Prescriptions Disp Refills     simvastatin (ZOCOR) 20 MG tablet [Pharmacy Med Name: SIMVASTATIN 20MG TABLETS] 30 tablet 0     Sig: TAKE 1 TABLET BY MOUTH AT BEDTIME   Last Written Prescription Date:  9/4/19  Last Fill Quantity: 90,  # refills: 0   Last office visit: 9/16/2019 with prescribing provider:     Future Office Visit:   Next 5 appointments (look out 90 days)    Oct 08, 2019  9:20 AM CDT  [facility] [address] [place] [id number]  [phone]             Statins Protocol Passed - 9/28/2019  5:13 AM        Passed - LDL on file in past 12 months     Recent Labs   Lab Test 09/16/19  0935   LDL 84             Passed - No abnormal creatine kinase in past 12 months     Recent Labs   Lab Test 09/14/18  1014   CKT 78                Passed - Recent (12 mo) or future (30 days) visit within the authorizing provider's specialty     Patient has had an office visit with the authorizing provider or a provider within the authorizing providers department within the previous 12 mos or has a future within next 30 days. See \"Patient Info\" tab in inbasket, or \"Choose Columns\" in Meds & Orders section of the refill encounter.              Passed - Medication is active on med list        Passed - Patient is age 18 or older        lisinopril (PRINIVIL/ZESTRIL) 20 MG tablet [Pharmacy Med Name: LISINOPRIL 20MG TABLETS] 30 tablet 0     Sig: TAKE 1 TABLET BY MOUTH EVERY DAY   Last Written Prescription Date:  9/4/19  Last Fill Quantity: 90,  # refills: 0   Last office visit: 9/16/2019 with prescribing provider:     Future Office Visit:   Next 5 appointments (look out 90 days)    Oct 08, 2019  9:20 AM CDT  [facility] [address] [place] [address] " "[id number] [phone]             ACE Inhibitors (Including Combos) Protocol Failed - 9/28/2019  5:13 AM        Failed - Blood pressure under 140/90 in past 12 months     BP Readings from Last 3 Encounters:   09/16/19 (!) 150/96   09/14/18 (!) 132/91   09/15/17 139/89                 Passed - Recent (12 mo) or future (30 days) visit within the authorizing provider's specialty     Patient has had an office visit with the authorizing provider or a provider within the authorizing providers department within the previous 12 mos or has a future within next 30 days. See \"Patient Info\" tab in inbasket, or \"Choose Columns\" in Meds & Orders section of the refill encounter.              Passed - Medication is active on med list        Passed - Patient is age 18 or older        Passed - Normal serum creatinine on file in past 12 months     Recent Labs   Lab Test 09/16/19  0935   CR 0.92             Passed - Normal serum potassium on file in past 12 months     Recent Labs   Lab Test 09/16/19  0935   POTASSIUM 4.7               "

## 2019-09-30 NOTE — TELEPHONE ENCOUNTER
Refills given on 9/4/19 for 90 day supply, refill request too soon. Refused.     Tiffany Cline RN on 9/30/2019 at 8:36 AM

## 2019-10-08 ENCOUNTER — OFFICE VISIT (OUTPATIENT)
Dept: FAMILY MEDICINE | Facility: CLINIC | Age: 69
End: 2019-10-08
Payer: MEDICARE

## 2019-10-08 VITALS
DIASTOLIC BLOOD PRESSURE: 72 MMHG | TEMPERATURE: 97.8 F | HEART RATE: 71 BPM | WEIGHT: 220 LBS | BODY MASS INDEX: 37.56 KG/M2 | SYSTOLIC BLOOD PRESSURE: 136 MMHG

## 2019-10-08 DIAGNOSIS — I10 HYPERTENSION GOAL BP (BLOOD PRESSURE) < 140/90: ICD-10-CM

## 2019-10-08 DIAGNOSIS — I10 ESSENTIAL HYPERTENSION WITH GOAL BLOOD PRESSURE LESS THAN 140/90: ICD-10-CM

## 2019-10-08 PROCEDURE — 99213 OFFICE O/P EST LOW 20 MIN: CPT | Performed by: FAMILY MEDICINE

## 2019-10-08 RX ORDER — LISINOPRIL 20 MG/1
20 TABLET ORAL DAILY
Qty: 90 TABLET | Refills: 3 | Status: SHIPPED | OUTPATIENT
Start: 2019-10-08 | End: 2020-09-17

## 2019-10-08 RX ORDER — HYDROCHLOROTHIAZIDE 12.5 MG/1
12.5 TABLET ORAL DAILY
Qty: 180 TABLET | Refills: 3 | Status: SHIPPED | OUTPATIENT
Start: 2019-10-08 | End: 2020-09-17

## 2019-10-08 NOTE — PROGRESS NOTES
Subjective     Lico Osullivan is a 69 year old male who presents to clinic today for the following health issues:    HPI     Blood pressure follow up  Patient ran out of pills on the weekend   He had lisinopril and he continued to  take     Ros: no chest pain, chest tightness   No sob   No leg edema   No abdominal pain     Denies fever or chills   Weight stable       Reviewed and updated as needed this visit by Provider         Review of Systems   Ros: no chest pain, chest tightness   No sob   No leg edema   No abdominal pain     Denies fever or chills   Weight stable         Objective    /72   Pulse 71   Temp 97.8  F (36.6  C) (Oral)   Wt 99.8 kg (220 lb)   BMI 37.56 kg/m    Body mass index is 37.56 kg/m .  Physical Exam   GENERAL: healthy, alert and no distress  EYES: Eyes grossly normal to inspection, PERRL and conjunctivae and sclerae normal  HENT: ear canals and TM's normal, nose and mouth without ulcers or lesions  NECK: no adenopathy, no asymmetry, masses, or scars and thyroid normal to palpation  RESP: lungs clear to auscultation - no rales, rhonchi or wheezes  CV: regular rate and rhythm, normal S1 S2, no S3 or S4, no murmur, click or rub, no peripheral edema and peripheral pulses strong      ICD-10-CM    1. Essential hypertension with goal blood pressure less than 140/90 I10 lisinopril (PRINIVIL/ZESTRIL) 20 MG tablet   2. Hypertension goal BP (blood pressure) < 140/90 I10 hydrochlorothiazide (HYDRODIURIL) 12.5 MG tablet     Decrease hydrochlorothiazide to once a day

## 2019-11-18 DIAGNOSIS — E78.5 HYPERLIPIDEMIA LDL GOAL <130: ICD-10-CM

## 2019-11-18 NOTE — TELEPHONE ENCOUNTER
"Requested Prescriptions   Pending Prescriptions Disp Refills     simvastatin (ZOCOR) 20 MG tablet [Pharmacy Med Name: SIMVASTATIN 20MG TABLETS] 90 tablet 0     Sig: TAKE 1 TABLET(20 MG) BY MOUTH AT BEDTIME   Last Written Prescription Date:  9/4/19  Last Fill Quantity: 90,  # refills: 0   Last office visit: 10/8/2019 with prescribing provider:     Future Office Visit:        Statins Protocol Passed - 11/18/2019 11:34 AM        Passed - LDL on file in past 12 months     Recent Labs   Lab Test 09/16/19  0935   LDL 84             Passed - No abnormal creatine kinase in past 12 months     Recent Labs   Lab Test 09/14/18  1014   CKT 78                Passed - Recent (12 mo) or future (30 days) visit within the authorizing provider's specialty     Patient has had an office visit with the authorizing provider or a provider within the authorizing providers department within the previous 12 mos or has a future within next 30 days. See \"Patient Info\" tab in inbasket, or \"Choose Columns\" in Meds & Orders section of the refill encounter.              Passed - Medication is active on med list        Passed - Patient is age 18 or older          "

## 2019-11-19 RX ORDER — SIMVASTATIN 20 MG
TABLET ORAL
Qty: 90 TABLET | Refills: 2 | Status: SHIPPED | OUTPATIENT
Start: 2019-11-19 | End: 2020-08-25

## 2019-11-19 NOTE — TELEPHONE ENCOUNTER
See plan at 10/8/19 visit:          Instructions         Return in about 6 months (around 4/8/2020) for BP Recheck.       Prescription approved per [facility] Refill Protocol.    Kori Mix RN  [facility]

## 2019-11-20 ENCOUNTER — OFFICE VISIT (OUTPATIENT)
Dept: OPHTHALMOLOGY | Facility: CLINIC | Age: 69
End: 2019-11-20
Payer: MEDICARE

## 2019-11-20 DIAGNOSIS — H43.399 VITREOUS OPACITIES: ICD-10-CM

## 2019-11-20 DIAGNOSIS — H35.61 RETINAL HEMORRHAGE OF RIGHT EYE: ICD-10-CM

## 2019-11-20 DIAGNOSIS — H52.4 PRESBYOPIA: ICD-10-CM

## 2019-11-20 DIAGNOSIS — H35.62 RETINAL HEMORRHAGE OF LEFT EYE: ICD-10-CM

## 2019-11-20 DIAGNOSIS — H35.371 EPIRETINAL MEMBRANE (ERM) OF RIGHT EYE: ICD-10-CM

## 2019-11-20 DIAGNOSIS — Z96.1 PSEUDOPHAKIA: Primary | ICD-10-CM

## 2019-11-20 PROCEDURE — 92014 COMPRE OPH EXAM EST PT 1/>: CPT | Performed by: OPHTHALMOLOGY

## 2019-11-20 PROCEDURE — 92015 DETERMINE REFRACTIVE STATE: CPT | Mod: GY | Performed by: OPHTHALMOLOGY

## 2019-11-20 ASSESSMENT — REFRACTION_MANIFEST
OD_ADD: +3.00
OD_SPHERE: -0.75
OS_ADD: +3.00
OD_CYLINDER: +0.50
OS_SPHERE: -1.00
OD_AXIS: 180
OS_CYLINDER: SPHERE

## 2019-11-20 ASSESSMENT — TONOMETRY
IOP_METHOD: APPLANATION
OD_IOP_MMHG: 21
OS_IOP_MMHG: 20

## 2019-11-20 ASSESSMENT — REFRACTION_WEARINGRX
OS_SPHERE: -1.25
OD_SPHERE: -0.75
SPECS_TYPE: PAL
OS_AXIS: 012
OD_CYLINDER: +0.25
OS_CYLINDER: +0.75
OS_ADD: +2.75
OD_AXIS: 176
OD_ADD: +2.75

## 2019-11-20 ASSESSMENT — VISUAL ACUITY
OD_CC: 20/20
CORRECTION_TYPE: GLASSES
METHOD: SNELLEN - LINEAR
OS_CC: 20/25
OD_CC: J1
OS_CC: J1

## 2019-11-20 ASSESSMENT — SLIT LAMP EXAM - LIDS: COMMENTS: 1+ DERMATOCHALASIS - UPPER LID, 1+ SCLERAL SHOW, 1+ MEIBOMIAN GLAND DYSFUNCTION

## 2019-11-20 ASSESSMENT — CUP TO DISC RATIO
OS_RATIO: 0.5
OD_RATIO: 0.3

## 2019-11-20 ASSESSMENT — CONF VISUAL FIELD
OD_NORMAL: 1
OS_NORMAL: 1

## 2019-11-20 ASSESSMENT — EXTERNAL EXAM - LEFT EYE: OS_EXAM: NORMAL

## 2019-11-20 NOTE — PATIENT INSTRUCTIONS
Glasses Rx given - optional  Use artificial tears up to 4 times daily both eyes.  (Refresh Tears, Systane Ultra/Balance, or Theratears)  Possible posterior vitreous detachment (sudden onset large floater and/or flashing lights) both eyes discussed.   Call in July 2020 for an appointment in November 2020 for Complete Exam.    Dr. Neal [phone]

## 2019-11-20 NOTE — LETTER
11/20/2019         RE: Lico Osullivan  [address] [facility] [id number]        Dear Colleague,    Thank you for referring your patient, Lico Osullivan, to the [facility]. Please see a copy of my visit note below.     Current Eye Medications:  Tears prn     Subjective:  Annual Eye Exam.     Objective:  See Ophthalmology Exam.       Assessment:  Stable eye exam.      ICD-10-CM    1. Pseuophakia, Yag Caps, ou Z96.1 EYE EXAM (SIMPLE-NONBILLABLE)   2. 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 Epiretinal membrane, mild, of right eye H35.371    4. Retinal hemorrhage of right eye H35.61    5. Hx of retinal hemorrhage of left eye, remotely H35.62    6. Presbyopia H52.4 REFRACTIVE STATUS        Plan:  Glasses Rx given - optional  Use artificial tears up to 4 times daily both eyes.  (Refresh Tears, Systane Ultra/Balance, or Theratears)  Possible posterior vitreous detachment (sudden onset large floater and/or flashing lights) both eyes discussed.   Call in July 2020 for an appointment in November 2020 for Complete Exam.    Dr. Neal [phone]           Again, thank you for allowing me to participate in the care of your patient.        Sincerely,        Glynn Neal MD

## 2019-11-20 NOTE — PROGRESS NOTES
Current Eye Medications:  Tears prn     Subjective:  Annual Eye Exam.     Objective:  See Ophthalmology Exam.       Assessment:  Stable eye exam.      ICD-10-CM    1. Pseuophakia, Yag Caps, ou Z96.1 EYE EXAM (SIMPLE-NONBILLABLE)   2. 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 Epiretinal membrane, mild, of right eye H35.371    4. Retinal hemorrhage of right eye H35.61    5. Hx of retinal hemorrhage of left eye, remotely H35.62    6. Presbyopia H52.4 REFRACTIVE STATUS        Plan:  Glasses Rx given - optional  Use artificial tears up to 4 times daily both eyes.  (Refresh Tears, Systane Ultra/Balance, or Theratears)  Possible posterior vitreous detachment (sudden onset large floater and/or flashing lights) both eyes discussed.   Call in July 2020 for an appointment in November 2020 for Complete Exam.    Dr. Neal [phone]

## 2019-11-21 PROBLEM — H35.371 EPIRETINAL MEMBRANE (ERM) OF RIGHT EYE: Status: ACTIVE | Noted: 2019-11-21

## 2019-11-21 PROBLEM — H35.61 RETINAL HEMORRHAGE OF RIGHT EYE: Status: ACTIVE | Noted: 2019-11-21

## 2020-02-24 ENCOUNTER — HEALTH MAINTENANCE LETTER (OUTPATIENT)
Age: 70
End: 2020-02-24

## 2020-08-10 ENCOUNTER — MYC MEDICAL ADVICE (OUTPATIENT)
Dept: FAMILY MEDICINE | Facility: CLINIC | Age: 70
End: 2020-08-10

## 2020-08-25 DIAGNOSIS — E78.5 HYPERLIPIDEMIA LDL GOAL <130: ICD-10-CM

## 2020-08-25 RX ORDER — SIMVASTATIN 20 MG
TABLET ORAL
Qty: 90 TABLET | Refills: 2 | Status: SHIPPED | OUTPATIENT
Start: 2020-08-25 | End: 2021-03-16

## 2020-08-25 NOTE — TELEPHONE ENCOUNTER
"Requested Prescriptions   Pending Prescriptions Disp Refills     simvastatin (ZOCOR) 20 MG tablet [Pharmacy Med Name: SIMVASTATIN 20MG TABLETS] 90 tablet 2     Sig: TAKE 1 TABLET(20 MG) BY MOUTH AT BEDTIME       Statins Protocol Passed - 8/25/2020 11:05 AM        Passed - LDL on file in past 12 months     Recent Labs   Lab Test 09/16/19  0935   LDL 84             Passed - No abnormal creatine kinase in past 12 months     Recent Labs   Lab Test 09/14/18  1014   CKT 78                Passed - Recent (12 mo) or future (30 days) visit within the authorizing provider's specialty     Patient has had an office visit with the authorizing provider or a provider within the authorizing providers department within the previous 12 mos or has a future within next 30 days. See \"Patient Info\" tab in inbasket, or \"Choose Columns\" in Meds & Orders section of the refill encounter.              Passed - Medication is active on med list        Passed - Patient is age 18 or older           Routing refill request to provider for review/approval because:  Still under Dr. Payne.  Briana Kelsey,RN,BSN  [facility]          '  "

## 2020-09-16 ASSESSMENT — ENCOUNTER SYMPTOMS
FEVER: 0
PARESTHESIAS: 0
ARTHRALGIAS: 0
CONSTIPATION: 0
HEADACHES: 0
PALPITATIONS: 0
HEMATURIA: 0
JOINT SWELLING: 0
CHILLS: 0
HEARTBURN: 0
FREQUENCY: 0
HEMATOCHEZIA: 0
EYE PAIN: 0
DIZZINESS: 0
NAUSEA: 0
DYSURIA: 0
DIARRHEA: 0
WEAKNESS: 0
MYALGIAS: 0
COUGH: 0
ABDOMINAL PAIN: 0
SHORTNESS OF BREATH: 0
NERVOUS/ANXIOUS: 0
SORE THROAT: 0

## 2020-09-16 ASSESSMENT — ACTIVITIES OF DAILY LIVING (ADL): CURRENT_FUNCTION: NO ASSISTANCE NEEDED

## 2020-09-17 ENCOUNTER — OFFICE VISIT (OUTPATIENT)
Dept: FAMILY MEDICINE | Facility: CLINIC | Age: 70
End: 2020-09-17
Payer: MEDICARE

## 2020-09-17 VITALS
WEIGHT: 225 LBS | HEIGHT: 64 IN | BODY MASS INDEX: 38.41 KG/M2 | SYSTOLIC BLOOD PRESSURE: 132 MMHG | DIASTOLIC BLOOD PRESSURE: 78 MMHG | HEART RATE: 71 BPM | OXYGEN SATURATION: 95 %

## 2020-09-17 DIAGNOSIS — E78.5 HYPERLIPIDEMIA LDL GOAL <130: ICD-10-CM

## 2020-09-17 DIAGNOSIS — C61 PROSTATE CA (H): Primary | ICD-10-CM

## 2020-09-17 DIAGNOSIS — I10 HYPERTENSION GOAL BP (BLOOD PRESSURE) < 140/90: ICD-10-CM

## 2020-09-17 DIAGNOSIS — Z23 NEED FOR SHINGLES VACCINE: ICD-10-CM

## 2020-09-17 DIAGNOSIS — Z00.00 ENCOUNTER FOR MEDICARE ANNUAL WELLNESS EXAM: ICD-10-CM

## 2020-09-17 DIAGNOSIS — I10 ESSENTIAL HYPERTENSION WITH GOAL BLOOD PRESSURE LESS THAN 140/90: ICD-10-CM

## 2020-09-17 LAB
ANION GAP SERPL CALCULATED.3IONS-SCNC: 8 MMOL/L (ref 3–14)
BUN SERPL-MCNC: 19 MG/DL (ref 7–30)
CALCIUM SERPL-MCNC: 8.9 MG/DL (ref 8.5–10.1)
CHLORIDE SERPL-SCNC: 107 MMOL/L (ref 94–109)
CHOLEST SERPL-MCNC: 167 MG/DL
CO2 SERPL-SCNC: 26 MMOL/L (ref 20–32)
CREAT SERPL-MCNC: 0.89 MG/DL (ref 0.66–1.25)
GFR SERPL CREATININE-BSD FRML MDRD: 87 ML/MIN/{1.73_M2}
GLUCOSE SERPL-MCNC: 120 MG/DL (ref 70–99)
HDLC SERPL-MCNC: 58 MG/DL
LDLC SERPL CALC-MCNC: 86 MG/DL
NONHDLC SERPL-MCNC: 109 MG/DL
POTASSIUM SERPL-SCNC: 3.7 MMOL/L (ref 3.4–5.3)
SODIUM SERPL-SCNC: 141 MMOL/L (ref 133–144)
TRIGL SERPL-MCNC: 117 MG/DL

## 2020-09-17 PROCEDURE — G0439 PPPS, SUBSEQ VISIT: HCPCS | Performed by: INTERNAL MEDICINE

## 2020-09-17 PROCEDURE — 80048 BASIC METABOLIC PNL TOTAL CA: CPT | Performed by: INTERNAL MEDICINE

## 2020-09-17 PROCEDURE — 80061 LIPID PANEL: CPT | Performed by: INTERNAL MEDICINE

## 2020-09-17 PROCEDURE — 36415 COLL VENOUS BLD VENIPUNCTURE: CPT | Performed by: INTERNAL MEDICINE

## 2020-09-17 PROCEDURE — 84153 ASSAY OF PSA TOTAL: CPT | Performed by: INTERNAL MEDICINE

## 2020-09-17 RX ORDER — HYDROCHLOROTHIAZIDE 12.5 MG/1
12.5 TABLET ORAL DAILY
Qty: 180 TABLET | Refills: 3 | Status: SHIPPED | OUTPATIENT
Start: 2020-09-17 | End: 2021-10-07

## 2020-09-17 RX ORDER — LISINOPRIL 20 MG/1
20 TABLET ORAL DAILY
Qty: 90 TABLET | Refills: 3 | Status: SHIPPED | OUTPATIENT
Start: 2020-09-17 | End: 2021-10-03

## 2020-09-17 ASSESSMENT — ENCOUNTER SYMPTOMS
PARESTHESIAS: 0
CHILLS: 0
MYALGIAS: 0
HEMATOCHEZIA: 0
DIARRHEA: 0
NAUSEA: 0
NERVOUS/ANXIOUS: 0
PALPITATIONS: 0
WEAKNESS: 0
JOINT SWELLING: 0
DYSURIA: 0
HEARTBURN: 0
ABDOMINAL PAIN: 0
SORE THROAT: 0
CONSTIPATION: 0
DIZZINESS: 0
SHORTNESS OF BREATH: 0
EYE PAIN: 0
HEMATURIA: 0
ARTHRALGIAS: 0
HEADACHES: 0
COUGH: 0
FREQUENCY: 0
FEVER: 0

## 2020-09-17 ASSESSMENT — ACTIVITIES OF DAILY LIVING (ADL): CURRENT_FUNCTION: NO ASSISTANCE NEEDED

## 2020-09-17 ASSESSMENT — MIFFLIN-ST. JEOR: SCORE: 1698.18

## 2020-09-17 NOTE — PATIENT INSTRUCTIONS
Patient Education   Personalized Prevention Plan  You are due for the preventive services outlined below.  Your care team is available to assist you in scheduling these services.  If you have already completed any of these items, please share that information with your care team to update in your medical record.  Health Maintenance Due   Topic Date Due     AORTIC ANEURYSM SCREENING (SYSTEM ASSIGNED)  06/05/2016     Zoster (Shingles) Vaccine (2 of 3) 11/20/2016     Flu Vaccine (1) 09/01/2020     FALL RISK ASSESSMENT  09/16/2020     Your Health Risk Assessment indicates you feel you are not in good health    A healthy lifestyle helps keep the body fit and the mind alert. It helps protect you from disease, helps you fight disease, and helps prevent chronic disease (disease that doesn't go away) from getting worse. This is important as you get older and begin to notice twinges in muscles and joints and a decline in the strength and stamina you once took for granted. A healthy lifestyle includes good healthcare, good nutrition, weight control, recreation, and regular exercise. Avoid harmful substances and do what you can to keep safe. Another part of a healthy lifestyle is stay mentally active and socially involved.    Good healthcare     Have a wellness visit every year.     If you have new symptoms, let us know right away. Don't wait until the next checkup.     Take medicines exactly as prescribed and keep your medicines in a safe place. Tell us if your medicine causes problems.   Healthy diet and weight control     Eat 3 or 4 small, nutritious, low-fat, high-fiber meals a day. Include a variety of fruits, vegetables, and whole-grain foods.     Make sure you get enough calcium in your diet. Calcium, vitamin D, and exercise help prevent osteoporosis (bone thinning).     If you live alone, try eating with others when you can. That way you get a good meal and have company while you eat it.     Try to keep a healthy  weight. If you eat more calories than your body uses for energy, it will be stored as fat and you will gain weight.     Recreation   Recreation is not limited to sports and team events. It includes any activity that provides relaxation, interest, enjoyment, and exercise. Recreation provides an outlet for physical, mental, and social energy. It can give a sense of worth and achievement. It can help you stay healthy.    Mental Exercise and Social Involvement  Mental and emotional health is as important as physical health. Keep in touch with friends and family. Stay as active as possible. Continue to learn and challenge yourself.   Things you can do to stay mentally active are:    Learn something new, like a foreign language or musical instrument.     Play SCRABBLE or do crossword puzzles. If you cannot find people to play these games with you at home, you can play them with others on your computer through the Internet.     Join a games club--anything from card games to chess or checkers or lawn bowling.     Start a new hobby.     Go back to school.     Volunteer.     Read.   Keep up with world events.    Exercise for a Healthier Heart     Exercise with a friend. When activity is fun, you're more likely to stick with it.   You may wonder how you can improve the health of your heart. If you re thinking about exercise, you re on the right track. You don t need to become an athlete, but you do need a certain amount of brisk exercise to help strengthen your heart. If you have been diagnosed with a heart condition, your doctor may recommend exercise to help stabilize your condition. To help make exercise a habit, choose safe, fun activities.  Be sure to check with your healthcare provider before starting an exercise program.  Why exercise?  Exercising regularly offers many healthy rewards. It can help you do all of the following:    Improve your blood cholesterol level to help prevent further heart trouble    Lower your  blood pressure to help prevent a stroke or heart attack    Control diabetes, or reduce your risk of getting this disease    Improve your heart and lung function    Reach and maintain a healthy weight    Make your muscles stronger and more limber so you can stay active    Prevent falls and fractures by slowing the loss of bone mass (osteoporosis)    Manage stress better    Reduce your blood pressure    Improve your sense of self and your body image  Exercise tips  Ease into your routine. Set small goals. Then build on them.  Exercise on most days. Aim for a total of 150 or more minutes of moderate to  vigorous intensity activity each week. Consider 40 minutes, 3 to 4 times a week. For best results, activity should last for 40 minutes on average. It is OK to work up to the 40 minute period over time. Examples of moderate-intensity activity is walking 1 mile in 15 minutes or 30 to 45 minutes of yard work.  Step up your daily activity level. Along with your exercise program, try being more active throughout the day. Walk instead of drive. Do more household tasks or yard work.  Choose one or more activities you enjoy. Walking is one of the easiest things you can do. You can also try swimming, riding a bike, dancing, or taking an exercise class.  Stop exercising and call your doctor if you:    Have chest pain or feel dizzy or lightheaded    Feel burning, tightness, pressure, or heaviness in your chest, neck, shoulders, back, or arms    Have unusual shortness of breath    Have increased joint or muscle pain    Have palpitations or an irregular heartbeat  Date Last Reviewed: 5/1/2016 2000-2019 [employer]. [address] [place] [address], [place] [address]. All rights reserved. This information is not intended as a substitute for professional medical care. Always follow your healthcare professional's instructions.          Understanding USDA MyPlate  The USDA (U.S. Department of Agriculture) has guidelines to help  you make healthy food choices. These are called MyPlate. MyPlate shows the food groups that make up healthy meals using the image of a place setting. Before you eat, think about the healthiest choices for what to put onto your plate or into your cup or bowl. To learn more about building a healthy plate, visit www.choosemyplate.gov.    The food groups    Fruits. Any fruit or 100% fruit juice counts as part of the Fruit Group. Fruits may be fresh, canned, frozen, or dried, and may be whole, cut-up, or pureed. Make half your plate fruits and vegetables.    Vegetables. Any vegetable or 100% vegetable juice counts as a member of the Vegetable Group. Vegetables may be fresh, frozen, canned, or dried. They can be served raw or cooked and may be whole, cut-up, or mashed. Make half your plate fruits and vegetables.    Grains. All foods made from grains are part of the Grains Group. These include wheat, rice, oats, cornmeal, and barley such as bread, pasta, oatmeal, cereal, tortillas, and grits. Grains should be no more than a quarter of your plate. At least half of your grains should be whole grains.    Protein. This group includes meat, poultry, seafood, beans and peas, eggs, processed soy products (like tofu), nuts (including nut butters), and seeds. Make protein choices no more than a quarter of your plate. Meat and poultry choices should be lean or low fat.    Dairy. All fluid milk products and foods made from milk that contain calcium, like yogurt and cheese, are part of the Dairy Group. (Foods that have little calcium, such as cream, butter, and cream cheese, are not part of the group.) Most dairy choices should be low-fat or fat-free.    Oils. These are fats that are liquid at room temperature. They include canola, corn, olive, soybean, and sunflower oil. Foods that are mainly oil include mayonnaise, certain salad dressings, and soft margarines. You should have only 5 to 7 teaspoons of oils a day. You probably already  get this much from the food you eat.  Date Last Reviewed: 8/1/2017 2000-2019 [employer]. [address] [facility], [place] [address]. All rights reserved. This information is not intended as a substitute for professional medical care. Always follow your healthcare professional's instructions.        Your Health Risk Assessment indicates you feel you are not in good emotional health.    Recreation   Recreation is not limited to sports and team events. It includes any activity that provides relaxation, interest, enjoyment, and exercise. Recreation provides an outlet for physical, mental, and social energy. It can give a sense of worth and achievement. It can help you stay healthy.    Mental Exercise and Social Involvement  Mental and emotional health is as important as physical health. Keep in touch with friends and family. Stay as active as possible. Continue to learn and challenge yourself.   Things you can do to stay mentally active are:    Learn something new, like a foreign language or musical instrument.     Play SCRABBLE or do crossword puzzles. If you cannot find people to play these games with you at home, you can play them with others on your computer through the Internet.     Join a games club--anything from card games to chess or checkers or lawn bowling.     Start a new hobby.     Go back to school.     Volunteer.     Read.   Keep up with world events.

## 2020-09-17 NOTE — PROGRESS NOTES
"SUBJECTIVE:   Lico Osullivan is a 70 year old male who presents for Preventive Visit.    Patient has been advised of split billing requirements and indicates understanding: Yes   Are you in the first 12 months of your Medicare coverage?  No     Healthy Habits:     In general, how would you rate your overall health?  Fair    Frequency of exercise:  None    Do you usually eat at least 4 servings of fruit and vegetables a day, include whole grains    & fiber and avoid regularly eating high fat or \"junk\" foods?  No    Taking medications regularly:  Yes    Barriers to taking medications:  None    Medication side effects:  None    Ability to successfully perform activities of daily living:  No assistance needed    Home Safety:  Throw rugs in the hallway    Hearing Impairment:  No hearing concerns    In the past 6 months, have you been bothered by leaking of urine?  No    In general, how would you rate your overall mental or emotional health?  Fair      PHQ-2 Total Score: 0    Additional concerns today:  No    Do you feel safe in your environment? Yes    Have you ever done Advance Care Planning? (For example, a Health Directive, POLST, or a discussion with a medical provider or your loved ones about your wishes): No, advance care planning information given to patient to review.  Patient plans to discuss their wishes with loved ones or provider.    Had surgery and prostate cancers  Used to see Dr. Geraldine CHUNG  ophthamology as well  Nursing assistant and retail    Fall risk  Fallen 2 or more times in the past year?: No  Any fall with injury in the past year?: No    Cognitive Screening   1) Repeat 3 items (Leader, Season, Table)    2) Clock draw: NORMAL  3) 3 item recall: Recalls 3 objects  Results: 3 items recalled: COGNITIVE IMPAIRMENT LESS LIKELY    Mini-CogTM Copyright BRENDEN Barba. Licensed by the author for use in [facility]; reprinted with permission (alexandro@.[facility]). All rights reserved.      Do you have sleep " apnea, excessive snoring or daytime drowsiness?: no    Reviewed and updated as needed this visit by clinical staff  Tobacco  Allergies  Meds       23 years - [facility]    Reviewed and updated as needed this visit by Provider        Social History     Tobacco Use     Smoking status: Former Smoker     Packs/day: 1.00     Years: 0.00     Pack years: 0.00     Types: Cigarettes     Last attempt to quit: 9/10/1994     Years since quittin.0     Smokeless tobacco: Never Used     Tobacco comment: smoke free household.   Substance Use Topics     Alcohol use: Yes     Alcohol/week: 6.0 standard drinks     Comment: occ.     If you drink alcohol do you typically have >3 drinks per day or >7 drinks per week? YES      AUDIT - Alcohol Use Disorders Identification Test - Reproduced from the World Health Organization Audit 2001 (Second Edition) 2020   1.  How often do you have a drink containing alcohol? 4 or more times a week   2.  How many drinks containing alcohol do you have on a typical day when you are drinking? 1 or 2   3.  How often do you have five or more drinks on one occasion? Never   4.  How often during the last year have you found that you were not able to stop drinking once you had started? Never   5.  How often during the last year have you failed to do what was normally expected of you because of drinking? Never   6.  How often during the last year have you needed a first drink in the morning to get yourself going after a heavy drinking session? Never   7.  How often during the last year have you had a feeling of guilt or remorse after drinking? Less than monthly   8.  How often during the last year have you been unable to remember what happened the night before because of your drinking? Never   9.  Have you or someone else been injured because of your drinking? No   10. Has a relative, friend, doctor or other health care worker been concerned about your drinking or suggested you cut down? No    TOTAL SCORE 5           Current providers sharing in care for this patient include:   Patient Care Team:  Eriberto Payne MD as PCP - General  Eriberto Payne MD as Assigned PCP    The following health maintenance items are reviewed in Epic and correct as of today:  Health Maintenance   Topic Date Due     AORTIC ANEURYSM SCREENING (SYSTEM ASSIGNED)  06/05/2016     ZOSTER IMMUNIZATION (2 of 3) 11/20/2016     INFLUENZA VACCINE (1) 09/01/2020     FALL RISK ASSESSMENT  09/16/2020     EYE EXAM  11/20/2020     COLORECTAL CANCER SCREENING  08/09/2021     MEDICARE ANNUAL WELLNESS VISIT  09/17/2021     LIPID  09/16/2024     ADVANCE CARE PLANNING  09/17/2025     DTAP/TDAP/TD IMMUNIZATION (3 - Td) 09/26/2026     HEPATITIS C SCREENING  Completed     PHQ-2  Completed     PNEUMOCOCCAL IMMUNIZATION 65+ LOW/MEDIUM RISK  Completed     IPV IMMUNIZATION  Aged Out     MENINGITIS IMMUNIZATION  Aged Out     HEPATITIS B IMMUNIZATION  Aged Out     Lab work is in process  Labs reviewed in EPIC  BP Readings from Last 3 Encounters:   09/17/20 132/78   10/08/19 136/72   09/16/19 (!) 150/96    Wt Readings from Last 3 Encounters:   09/17/20 102.1 kg (225 lb)   10/08/19 99.8 kg (220 lb)   09/16/19 99.7 kg (219 lb 12 oz)                  Patient Active Problem List   Diagnosis     Prostate CA (H)     Colon polyp     HYPERLIPIDEMIA LDL GOAL <130     Advanced directives, counseling/discussion     Hypertension goal BP (blood pressure) < 140/90     Vitreous condensations, os     Pseuophakia, Yag Caps, ou     Severe obesity (BMI 35.0-35.9 with comorbidity) (H)     Hx of retinal hemorrhage of left eye, remotely     Epiretinal membrane, mild, of right eye     Retinal hemorrhage of right eye     Past Surgical History:   Procedure Laterality Date     CATARACT IOL, RT/LT       CL AFF SURGICAL PATHOLOGY      2006     COLONOSCOPY  8/9/2011    Procedure:COMBINED COLONOSCOPY, REMOVE TUMOR/POLYP/LESION BY SNARE; Surgeon:ALBARO LANGFORD;  Location:[facility] OR     LASER YAG CAPSULOTOMY  10/2015; 2016    left eye; right eye     PHACOEMULSIFICATION WITH STANDARD INTRAOCULAR LENS IMPLANT  10/2012; 2014    left eye; right eye     SUPRAPUBIC PROSTATECTOMY         Social History     Tobacco Use     Smoking status: Former Smoker     Packs/day: 1.00     Years: 0.00     Pack years: 0.00     Types: Cigarettes     Last attempt to quit: 9/10/1994     Years since quittin.0     Smokeless tobacco: Never Used     Tobacco comment: smoke free household.   Substance Use Topics     Alcohol use: Yes     Alcohol/week: 6.0 standard drinks     Comment: occ.     Family History   Problem Relation Age of Onset     C.A.D. Father      Psychotic Disorder Father         schizophrenia     Prostate Cancer Father         full surgery     Diabetes Father      Asthma Other         nephew     Prostate Cancer Paternal Grandfather      Cerebrovascular Disease Paternal Grandfather      Hypertension Sister      Cataracts Sister      Psychotic Disorder Sister         schizophrenia     Prostate Cancer Maternal Grandmother      Prostate Cancer Maternal Grandfather         partial surgery     Asthma Other      Cataracts Mother      Asthma Nephew         childhood case         Current Outpatient Medications   Medication Sig Dispense Refill     Acetaminophen (TYLENOL PO) As needed       aspirin 325 MG EC tablet Take  by mouth daily. 1 tablet daily         hydrochlorothiazide (HYDRODIURIL) 12.5 MG tablet Take 1 tablet (12.5 mg) by mouth daily 180 tablet 3     lisinopril (ZESTRIL) 20 MG tablet Take 1 tablet (20 mg) by mouth daily 90 tablet 3     simvastatin (ZOCOR) 20 MG tablet TAKE 1 TABLET(20 MG) BY MOUTH AT BEDTIME 90 tablet 2     No Known Allergies  Recent Labs   Lab Test 19  0935 18  1014 09/15/17  1129   LDL 84 104* 85   HDL 66 59 55   TRIG 116 151* 142   ALT 41 49 37   CR 0.92 0.88 0.89   GFRESTIMATED 84 86 86   GFRESTBLACK >90 >90 >90   POTASSIUM 4.7 4.5 4.5     "      Review of Systems   Constitutional: Negative for chills and fever.   HENT: Negative for congestion, ear pain, hearing loss and sore throat.    Eyes: Negative for pain and visual disturbance.   Respiratory: Negative for cough and shortness of breath.    Cardiovascular: Negative for chest pain, palpitations and peripheral edema.   Gastrointestinal: Negative for abdominal pain, constipation, diarrhea, heartburn, hematochezia and nausea.   Genitourinary: Negative for discharge, dysuria, frequency, genital sores, hematuria, impotence and urgency.   Musculoskeletal: Negative for arthralgias, joint swelling and myalgias.   Skin: Negative for rash.   Neurological: Negative for dizziness, weakness, headaches and paresthesias.   Psychiatric/Behavioral: Negative for mood changes. The patient is not nervous/anxious.      Constitutional, HEENT, cardiovascular, pulmonary, gi and gu systems are negative, except as otherwise noted.    OBJECTIVE:   /78   Pulse 71   Ht 1.628 m (5' 4.1\")   Wt 102.1 kg (225 lb)   SpO2 95%   BMI 38.50 kg/m   Estimated body mass index is 38.5 kg/m  as calculated from the following:    Height as of this encounter: 1.628 m (5' 4.1\").    Weight as of this encounter: 102.1 kg (225 lb).  Physical Exam  GENERAL: healthy, alert and no distress  EYES: Eyes grossly normal to inspection, PERRL and conjunctivae and sclerae normal  HENT: ear canals and TM's normal, nose and mouth without ulcers or lesions  NECK: no adenopathy, no asymmetry, masses, or scars and thyroid normal to palpation  RESP: lungs clear to auscultation - no rales, rhonchi or wheezes  CV: regular rate and rhythm, normal S1 S2, no S3 or S4, no murmur, click or rub, no peripheral edema and peripheral pulses strong  ABDOMEN: soft, nontender, no hepatosplenomegaly, no masses and bowel sounds normal  MS: no gross musculoskeletal defects noted, no edema  SKIN: no suspicious lesions or rashes  NEURO: Normal strength and tone, mentation " "intact and speech normal  BACK: no CVA tenderness, no paralumbar tenderness  PSYCH: mentation appears normal, affect normal/bright  LYMPH: no cervical, supraclavicular, axillary, or inguinal adenopathy    Diagnostic Test Results:  Labs reviewed in Epic  No results found for any visits on 09/17/20.    ASSESSMENT / PLAN:       ICD-10-CM    1. Prostate CA (H)  C61 PSA, tumor marker   2. Hypertension goal BP (blood pressure) < 140/90  I10 Basic metabolic panel  (Ca, Cl, CO2, Creat, Gluc, K, Na, BUN)     hydrochlorothiazide (HYDRODIURIL) 12.5 MG tablet   3. Hyperlipidemia LDL goal <130  E78.5 Lipid panel reflex to direct LDL Fasting   4. Need for shingles vaccine  Z23    5. Essential hypertension with goal blood pressure less than 140/90  I10 lisinopril (ZESTRIL) 20 MG tablet       Patient has been advised of split billing requirements and indicates understanding: Yes  COUNSELING:  Reviewed preventive health counseling, as reflected in patient instructions       Regular exercise       Healthy diet/nutrition       Vision screening       Hearing screening       Dental care       Bladder control       Fall risk prevention       Colon cancer screening    Estimated body mass index is 38.5 kg/m  as calculated from the following:    Height as of this encounter: 1.628 m (5' 4.1\").    Weight as of this encounter: 102.1 kg (225 lb).    Weight management plan: Discussed healthy diet and exercise guidelines    He reports that he quit smoking about 26 years ago. His smoking use included cigarettes. He smoked 1.00 pack per day for 0.00 years. He has never used smokeless tobacco.      Appropriate preventive services were discussed with this patient, including applicable screening as appropriate for cardiovascular disease, diabetes, osteopenia/osteoporosis, and glaucoma.  As appropriate for age/gender, discussed screening for colorectal cancer, prostate cancer, breast cancer, and cervical cancer. Checklist reviewing preventive services " available has been given to the patient.    Reviewed patients plan of care and provided an AVS. The Basic Care Plan (routine screening as documented in Health Maintenance) for Lico meets the Care Plan requirement. This Care Plan has been established and reviewed with the Patient.    Counseling Resources:  ATP IV Guidelines  Pooled Cohorts Equation Calculator  Breast Cancer Risk Calculator  Breast Cancer: Medication to Reduce Risk  FRAX Risk Assessment  ICSI Preventive Guidelines  Dietary Guidelines for Americans, 2010  natue's MyPlate  ASA Prophylaxis  Lung CA Screening    Demetrius Lassiter MD  [facility]    Identified Health Risks:    The patient was provided with suggestions to help him develop a healthy physical lifestyle.  He is at risk for lack of exercise and has been provided with information to increase physical activity for the benefit of his well-being.  The patient was counseled and encouraged to consider modifying their diet and eating habits. He was provided with information on recommended healthy diet options.  The patient was provided with suggestions to help him develop a healthy emotional lifestyle.

## 2020-09-18 LAB — PSA SERPL-MCNC: 0.03 UG/L (ref 0–4)

## 2020-11-18 ENCOUNTER — OFFICE VISIT (OUTPATIENT)
Dept: OPHTHALMOLOGY | Facility: CLINIC | Age: 70
End: 2020-11-18
Payer: MEDICARE

## 2020-11-18 DIAGNOSIS — Z96.1 PSEUDOPHAKIA: Primary | ICD-10-CM

## 2020-11-18 DIAGNOSIS — Z01.01 ENCOUNTER FOR EXAMINATION OF EYES AND VISION WITH ABNORMAL FINDINGS: ICD-10-CM

## 2020-11-18 DIAGNOSIS — H52.4 PRESBYOPIA: ICD-10-CM

## 2020-11-18 DIAGNOSIS — H35.371 EPIRETINAL MEMBRANE (ERM) OF RIGHT EYE: ICD-10-CM

## 2020-11-18 DIAGNOSIS — H43.399 VITREOUS OPACITIES: ICD-10-CM

## 2020-11-18 DIAGNOSIS — H40.003 GLAUCOMA SUSPECT OF BOTH EYES: ICD-10-CM

## 2020-11-18 PROCEDURE — 92015 DETERMINE REFRACTIVE STATE: CPT | Mod: GY | Performed by: OPHTHALMOLOGY

## 2020-11-18 PROCEDURE — 92014 COMPRE OPH EXAM EST PT 1/>: CPT | Performed by: OPHTHALMOLOGY

## 2020-11-18 ASSESSMENT — REFRACTION_WEARINGRX
SPECS_TYPE: PAL
OD_ADD: +3.00
OD_SPHERE: -0.75
OD_CYLINDER: SPHERE
OS_ADD: +3.00
OS_AXIS: 140
OS_SPHERE: -1.25
OS_CYLINDER: +0.50

## 2020-11-18 ASSESSMENT — CONF VISUAL FIELD
OD_NORMAL: 1
OS_NORMAL: 1

## 2020-11-18 ASSESSMENT — REFRACTION_MANIFEST
OS_SPHERE: -1.25
OD_SPHERE: -0.75
OD_CYLINDER: SPHERE
OS_CYLINDER: SPHERE

## 2020-11-18 ASSESSMENT — VISUAL ACUITY
METHOD: SNELLEN - LINEAR
CORRECTION_TYPE: GLASSES
OS_CC: 20/25
OD_CC: 20/20
OD_CC: J1
OS_CC: J1

## 2020-11-18 ASSESSMENT — CUP TO DISC RATIO
OD_RATIO: 0.3
OS_RATIO: 0.6

## 2020-11-18 ASSESSMENT — TONOMETRY
IOP_METHOD: APPLANATION
OD_IOP_MMHG: 22
OS_IOP_MMHG: 18

## 2020-11-18 ASSESSMENT — SLIT LAMP EXAM - LIDS: COMMENTS: 1+ DERMATOCHALASIS - UPPER LID, 1+ SCLERAL SHOW, 1+ MEIBOMIAN GLAND DYSFUNCTION

## 2020-11-18 ASSESSMENT — EXTERNAL EXAM - LEFT EYE: OS_EXAM: NORMAL

## 2020-11-18 NOTE — PROGRESS NOTES
Current Eye Medications:  Tears prn      Subjective:  Comprehensive eye exam.   Pt reports that her is seeing well, however his eyes can feel dry.     Objective:  See Ophthalmology Exam.       Assessment:  Possible disc worsening left eye; otherwise stable eye exam.      ICD-10-CM    1. Pseuophakia, Yag Caps, ou  Z96.1    2. Glaucoma suspect of both eyes  H40.003    3. Epiretinal membrane, mild, of right eye  H35.371    4. Vitreous condensations, os  H43.399    5. Encounter for examination of eyes and vision with abnormal findings  Z01.01    6. Presbyopia  H52.4 REFRACTIVE STATUS     EYE EXAM (SIMPLE-NONBILLABLE)         Plan:  Glasses Rx given - optional  Use artificial tears up to 4 times daily both eyes as needed.  (Refresh Tears, Systane Ultra/Balance, or Theratears).  Return visit 6 months for an intraocular pressure check, glaucoma OCT, retinal OCT, Rosas Visual Field and pachymetry.  Glynn Neal M.D.  [phone]

## 2020-11-18 NOTE — PATIENT INSTRUCTIONS
Glasses Rx given - optional  Use artificial tears up to 4 times daily both eyes as needed.  (Refresh Tears, Systane Ultra/Balance, or Theratears).  Return visit 6 months for an intraocular pressure check, glaucoma OCT, retinal OCT, Rosas Visual Field and pachymetry.  Glynn Neal M.D.  [phone]

## 2020-11-18 NOTE — LETTER
11/18/2020         RE: Lico Osullivan  [address] [facility] [address]        Dear Colleague,    Thank you for referring your patient, Lico Osullivan, to the [facility]. Please see a copy of my visit note below.     Current Eye Medications:  Tears prn      Subjective:  Comprehensive eye exam.   Pt reports that her is seeing well, however his eyes can feel dry.     Objective:  See Ophthalmology Exam.       Assessment:  Possible disc worsening left eye; otherwise stable eye exam.      ICD-10-CM    1. Pseuophakia, Yag Caps, ou  Z96.1    2. Glaucoma suspect of both eyes  H40.003    3. Epiretinal membrane, mild, of right eye  H35.371    4. Vitreous condensations, os  H43.399    5. Encounter for examination of eyes and vision with abnormal findings  Z01.01    6. Presbyopia  H52.4 REFRACTIVE STATUS     EYE EXAM (SIMPLE-NONBILLABLE)         Plan:  Glasses Rx given - optional  Use artificial tears up to 4 times daily both eyes as needed.  (Refresh Tears, Systane Ultra/Balance, or Theratears).  Return visit 6 months for an intraocular pressure check, glaucoma OCT, retinal OCT, Rosas Visual Field and pachymetry.  Glynn Neal M.D.  [phone]             Again, thank you for allowing me to participate in the care of your patient.        Sincerely,        Glynn Neal MD

## 2020-12-13 ENCOUNTER — HEALTH MAINTENANCE LETTER (OUTPATIENT)
Age: 70
End: 2020-12-13

## 2021-03-16 DIAGNOSIS — E78.5 HYPERLIPIDEMIA LDL GOAL <130: ICD-10-CM

## 2021-03-16 RX ORDER — SIMVASTATIN 20 MG
TABLET ORAL
Qty: 90 TABLET | Refills: 2 | Status: SHIPPED | OUTPATIENT
Start: 2021-03-16 | End: 2021-10-07

## 2021-05-10 ENCOUNTER — OFFICE VISIT (OUTPATIENT)
Dept: OPHTHALMOLOGY | Facility: CLINIC | Age: 71
End: 2021-05-10
Payer: MEDICARE

## 2021-05-10 DIAGNOSIS — H35.371 EPIRETINAL MEMBRANE (ERM) OF RIGHT EYE: ICD-10-CM

## 2021-05-10 DIAGNOSIS — H40.003 GLAUCOMA SUSPECT OF BOTH EYES: Primary | ICD-10-CM

## 2021-05-10 PROCEDURE — 92012 INTRM OPH EXAM EST PATIENT: CPT | Performed by: OPHTHALMOLOGY

## 2021-05-10 PROCEDURE — 92134 CPTRZ OPH DX IMG PST SGM RTA: CPT | Performed by: OPHTHALMOLOGY

## 2021-05-10 PROCEDURE — 92083 EXTENDED VISUAL FIELD XM: CPT | Performed by: OPHTHALMOLOGY

## 2021-05-10 ASSESSMENT — TONOMETRY
OD_IOP_MMHG: 21
IOP_METHOD: APPLANATION
OS_IOP_MMHG: 18

## 2021-05-10 ASSESSMENT — PACHYMETRY
OD_CT(UM): 580
OS_CT(UM): 577

## 2021-05-10 ASSESSMENT — SLIT LAMP EXAM - LIDS: COMMENTS: 1+ DERMATOCHALASIS - UPPER LID, 1+ SCLERAL SHOW, 1+ MEIBOMIAN GLAND DYSFUNCTION

## 2021-05-10 ASSESSMENT — VISUAL ACUITY
OS_CC: 20/20-1
METHOD: SNELLEN - LINEAR
OD_CC: 20/20

## 2021-05-10 ASSESSMENT — EXTERNAL EXAM - LEFT EYE: OS_EXAM: NORMAL

## 2021-05-10 NOTE — LETTER
"    5/10/2021         RE: Lico Osullivan  [address] [facility] [address]        Dear Colleague,    Thank you for referring your patient, Lico Osullivan, to the [facility]. Please see a copy of my visit note below.     Current Eye Medications:  Tears prn , and clear eyes on rare occasions.     Subjective:  6 mo iop with octs(ON and retinal) plus HVF and pach.  Pt reports that he is seeing pretty good and his eye can be dry sometimes.     Objective:  See Ophthalmology Exam.       Assessment:  Stable intraocular pressure, glaucoma OCT, and Rosas Visual Field both eyes in patient who is a glaucoma suspect.  Stable retinal OCT regarding mild epiretinal membrane right eye.  Corneas intermediate thickness on pachy.      Plan:  No sign of glaucoma on testing today.    Stable mild \"epiretinal membrane\" in the right eye.  Return visit 6 months for a complete exam.  Glynn Neal M.D.  [phone]             Again, thank you for allowing me to participate in the care of your patient.        Sincerely,        Glynn Neal MD    "

## 2021-05-10 NOTE — PATIENT INSTRUCTIONS
"No sign of glaucoma on testing today.    Stable mild \"epiretinal membrane\" in the right eye.  Return visit 6 months for a complete exam.  Glynn Neal M.D.  [phone]    "

## 2021-05-10 NOTE — PROGRESS NOTES
" Current Eye Medications:  Tears prn , and clear eyes on rare occasions.     Subjective:  6 mo iop with octs(ON and retinal) plus HVF and pach.  Pt reports that he is seeing pretty good and his eye can be dry sometimes.     Objective:  See Ophthalmology Exam.       Assessment:  Stable intraocular pressure, glaucoma OCT, and Rosas Visual Field both eyes in patient who is a glaucoma suspect.  Stable retinal OCT regarding mild epiretinal membrane right eye.  Corneas intermediate thickness on pachy.      Plan:  No sign of glaucoma on testing today.    Stable mild \"epiretinal membrane\" in the right eye.  Return visit 6 months for a complete exam.  Glynn Neal M.D.  [phone]         "

## 2021-09-26 ENCOUNTER — HEALTH MAINTENANCE LETTER (OUTPATIENT)
Age: 71
End: 2021-09-26

## 2021-09-30 ENCOUNTER — MYC MEDICAL ADVICE (OUTPATIENT)
Dept: FAMILY MEDICINE | Facility: CLINIC | Age: 71
End: 2021-09-30

## 2021-09-30 DIAGNOSIS — I10 ESSENTIAL HYPERTENSION WITH GOAL BLOOD PRESSURE LESS THAN 140/90: ICD-10-CM

## 2021-10-03 RX ORDER — LISINOPRIL 20 MG/1
20 TABLET ORAL DAILY
Qty: 90 TABLET | Refills: 0 | Status: SHIPPED | OUTPATIENT
Start: 2021-10-03 | End: 2021-10-07

## 2021-10-07 ENCOUNTER — LAB (OUTPATIENT)
Dept: LAB | Facility: CLINIC | Age: 71
End: 2021-10-07
Payer: MEDICARE

## 2021-10-07 ENCOUNTER — DOCUMENTATION ONLY (OUTPATIENT)
Dept: LAB | Facility: CLINIC | Age: 71
End: 2021-10-07

## 2021-10-07 DIAGNOSIS — I10 HYPERTENSION GOAL BP (BLOOD PRESSURE) < 140/90: ICD-10-CM

## 2021-10-07 DIAGNOSIS — E78.5 HYPERLIPIDEMIA LDL GOAL <130: Primary | ICD-10-CM

## 2021-10-07 DIAGNOSIS — I10 ESSENTIAL HYPERTENSION WITH GOAL BLOOD PRESSURE LESS THAN 140/90: ICD-10-CM

## 2021-10-07 DIAGNOSIS — E78.5 HYPERLIPIDEMIA LDL GOAL <130: ICD-10-CM

## 2021-10-07 LAB
ANION GAP SERPL CALCULATED.3IONS-SCNC: 5 MMOL/L (ref 3–14)
BUN SERPL-MCNC: 17 MG/DL (ref 7–30)
CALCIUM SERPL-MCNC: 9.1 MG/DL (ref 8.5–10.1)
CHLORIDE BLD-SCNC: 105 MMOL/L (ref 94–109)
CHOLEST SERPL-MCNC: 178 MG/DL
CO2 SERPL-SCNC: 30 MMOL/L (ref 20–32)
CREAT SERPL-MCNC: 0.9 MG/DL (ref 0.66–1.25)
GFR SERPL CREATININE-BSD FRML MDRD: 86 ML/MIN/1.73M2
GLUCOSE BLD-MCNC: 118 MG/DL (ref 70–99)
HDLC SERPL-MCNC: 53 MG/DL
HOLD SPECIMEN: NORMAL
LDLC SERPL CALC-MCNC: 103 MG/DL
NONHDLC SERPL-MCNC: 125 MG/DL
POTASSIUM BLD-SCNC: 3.9 MMOL/L (ref 3.4–5.3)
SODIUM SERPL-SCNC: 140 MMOL/L (ref 133–144)
TRIGL SERPL-MCNC: 110 MG/DL

## 2021-10-07 PROCEDURE — 80061 LIPID PANEL: CPT

## 2021-10-07 PROCEDURE — 36415 COLL VENOUS BLD VENIPUNCTURE: CPT

## 2021-10-07 PROCEDURE — 80048 BASIC METABOLIC PNL TOTAL CA: CPT

## 2021-10-07 RX ORDER — HYDROCHLOROTHIAZIDE 12.5 MG/1
12.5 TABLET ORAL DAILY
Qty: 90 TABLET | Refills: 4 | Status: SHIPPED | OUTPATIENT
Start: 2021-10-07 | End: 2022-12-14

## 2021-10-07 RX ORDER — LISINOPRIL 20 MG/1
20 TABLET ORAL DAILY
Qty: 90 TABLET | Refills: 4 | Status: SHIPPED | OUTPATIENT
Start: 2021-10-07 | End: 2022-11-14

## 2021-10-07 RX ORDER — SIMVASTATIN 20 MG
20 TABLET ORAL AT BEDTIME
Qty: 90 TABLET | Refills: 4 | Status: SHIPPED | OUTPATIENT
Start: 2021-10-07 | End: 2022-12-14

## 2021-10-11 ENCOUNTER — OFFICE VISIT (OUTPATIENT)
Dept: FAMILY MEDICINE | Facility: CLINIC | Age: 71
End: 2021-10-11
Payer: MEDICARE

## 2021-10-11 VITALS
BODY MASS INDEX: 38.84 KG/M2 | HEART RATE: 56 BPM | SYSTOLIC BLOOD PRESSURE: 134 MMHG | WEIGHT: 227 LBS | OXYGEN SATURATION: 98 % | DIASTOLIC BLOOD PRESSURE: 80 MMHG

## 2021-10-11 DIAGNOSIS — Z12.11 SCREEN FOR COLON CANCER: Primary | ICD-10-CM

## 2021-10-11 PROCEDURE — 99213 OFFICE O/P EST LOW 20 MIN: CPT | Performed by: INTERNAL MEDICINE

## 2021-10-11 NOTE — PROGRESS NOTES
Assessment & Plan   Problem List Items Addressed This Visit     None      Visit Diagnoses     Screen for colon cancer    -  Primary    Relevant Orders    REVIEW OF HEALTH MAINTENANCE PROTOCOL ORDERS (Completed)    Adult Gastro Ref - Procedure Only         We spent 20 minutes reviewing the risk and benefits of covid and flu vaccine             Work on weight loss  Regular exercise    No follow-ups on file.    Demetrius Lassiter MD  [facility] DARIEN Barfield is a 71 year old who presents for the following health issues     HPI     Medication recheck  Concerns about Covid.             Review of Systems   Constitutional, HEENT, cardiovascular, pulmonary, gi and gu systems are negative, except as otherwise noted.      Objective    /80   Pulse 56   Wt 103 kg (227 lb)   SpO2 98%   BMI 38.84 kg/m    Body mass index is 38.84 kg/m .  Physical Exam   GENERAL: healthy, alert and no distress  EYES: Eyes grossly normal to inspection, PERRL and conjunctivae and sclerae normal  HENT: ear canals and TM's normal, nose and mouth without ulcers or lesions  NECK: no adenopathy, no asymmetry, masses, or scars and thyroid normal to palpation  RESP: lungs clear to auscultation - no rales, rhonchi or wheezes  CV: regular rate and rhythm, normal S1 S2, no S3 or S4, no murmur, click or rub, no peripheral edema and peripheral pulses strong  ABDOMEN: soft, nontender, no hepatosplenomegaly, no masses and bowel sounds normal  MS: no gross musculoskeletal defects noted, no edema  SKIN: no suspicious lesions or rashes  NEURO: Normal strength and tone, mentation intact and speech normal  BACK: no CVA tenderness, no paralumbar tenderness  PSYCH: mentation appears normal, affect normal/bright  LYMPH: no cervical, supraclavicular, axillary, or inguinal adenopathy    No results found for any visits on 10/11/21.

## 2021-11-10 ENCOUNTER — OFFICE VISIT (OUTPATIENT)
Dept: OPHTHALMOLOGY | Facility: CLINIC | Age: 71
End: 2021-11-10
Payer: MEDICARE

## 2021-11-10 DIAGNOSIS — H40.003 GLAUCOMA SUSPECT OF BOTH EYES: ICD-10-CM

## 2021-11-10 DIAGNOSIS — Z96.1 PSEUDOPHAKIA: Primary | ICD-10-CM

## 2021-11-10 DIAGNOSIS — Z01.01 ENCOUNTER FOR EXAMINATION OF EYES AND VISION WITH ABNORMAL FINDINGS: ICD-10-CM

## 2021-11-10 DIAGNOSIS — H35.371 EPIRETINAL MEMBRANE (ERM) OF RIGHT EYE: ICD-10-CM

## 2021-11-10 DIAGNOSIS — H52.4 PRESBYOPIA: ICD-10-CM

## 2021-11-10 DIAGNOSIS — H35.62 RETINAL HEMORRHAGE OF LEFT EYE: ICD-10-CM

## 2021-11-10 PROCEDURE — 92014 COMPRE OPH EXAM EST PT 1/>: CPT | Performed by: OPHTHALMOLOGY

## 2021-11-10 PROCEDURE — 92015 DETERMINE REFRACTIVE STATE: CPT | Mod: GY | Performed by: OPHTHALMOLOGY

## 2021-11-10 ASSESSMENT — REFRACTION_MANIFEST
OS_CYLINDER: SPHERE
OS_ADD: +2.50
OD_ADD: +2.50
OD_CYLINDER: SPHERE
OS_SPHERE: -1.50
OD_SPHERE: -0.50

## 2021-11-10 ASSESSMENT — SLIT LAMP EXAM - LIDS: COMMENTS: 1+ DERMATOCHALASIS - UPPER LID, 1+ SCLERAL SHOW, 1+ MEIBOMIAN GLAND DYSFUNCTION

## 2021-11-10 ASSESSMENT — VISUAL ACUITY
OS_CC+: -3
CORRECTION_TYPE: GLASSES
OD_CC+: -2
OD_CC: 20/20
METHOD: SNELLEN - LINEAR
OS_CC: 20/25

## 2021-11-10 ASSESSMENT — REFRACTION_WEARINGRX
OD_CYLINDER: SPHERE
OD_SPHERE: -0.75
OS_CYLINDER: +0.25
OD_ADD: +3.00
OS_AXIS: 037
SPECS_TYPE: PAL
OS_SPHERE: -1.50
OS_ADD: +3.00

## 2021-11-10 ASSESSMENT — CONF VISUAL FIELD
OS_NORMAL: 1
OD_NORMAL: 1

## 2021-11-10 ASSESSMENT — CUP TO DISC RATIO
OS_RATIO: 0.6
OD_RATIO: 0.3

## 2021-11-10 ASSESSMENT — TONOMETRY
IOP_METHOD: APPLANATION
OS_IOP_MMHG: 22
OD_IOP_MMHG: 26

## 2021-11-10 ASSESSMENT — EXTERNAL EXAM - LEFT EYE: OS_EXAM: NORMAL

## 2021-11-10 NOTE — PROGRESS NOTES
Current Eye Medications:  Artificial tears both eyes as needed.  Clear eyes both eyes on a rare occasion.       Subjective:  Comprehensive Eye Exam.  He feels his glasses do not fit him properly because he is constantly having to adjust them on his face.       Objective:  See Ophthalmology Exam.       Assessment:  Stable cup/disc asymmetry in patient with ocular hypertension; otherwise stable eye exam.      ICD-10-CM    1. Pseuophakia, Yag Caps, ou  Z96.1 EYE EXAM (SIMPLE-NONBILLABLE)   2. Glaucoma suspect of both eyes  H40.003    3. Hx of retinal hemorrhage of left eye, remotely  H35.62    4. Epiretinal membrane, mild, of right eye  H35.371    5. Encounter for examination of eyes and vision with abnormal findings  Z01.01    6. Presbyopia  H52.4 REFRACTIVE STATUS        Plan:  Possible posterior vitreous detachment (sudden onset large floater and/or flashing lights) both eyes discussed.   Continue observation with regard to glaucoma suspect status.   May use artificial tears up to 4 times daily both eyes. (Refresh Tears, Systane Ultra/Balance, or Theratears)   Glasses Rx given - optional   Return visit 6 months for intraocular pressure check, glaucoma OCT, retinal OCT and Rosas Visual Field.    Glynn Neal M.D.  [phone]

## 2021-11-10 NOTE — LETTER
11/10/2021         RE: Lico Osullivan  [address] [facility] [address]        Dear Colleague,    Thank you for referring your patient, Lico Osullivan, to the [facility]. Please see a copy of my visit note below.     Current Eye Medications:  Artificial tears both eyes as needed.  Clear eyes both eyes on a rare occasion.       Subjective:  Comprehensive Eye Exam.  He feels his glasses do not fit him properly because he is constantly having to adjust them on his face.       Objective:  See Ophthalmology Exam.       Assessment:  Stable cup/disc asymmetry in patient with ocular hypertension; otherwise stable eye exam.      ICD-10-CM    1. Pseuophakia, Yag Caps, ou  Z96.1 EYE EXAM (SIMPLE-NONBILLABLE)   2. Glaucoma suspect of both eyes  H40.003    3. Hx of retinal hemorrhage of left eye, remotely  H35.62    4. Epiretinal membrane, mild, of right eye  H35.371    5. Encounter for examination of eyes and vision with abnormal findings  Z01.01    6. Presbyopia  H52.4 REFRACTIVE STATUS        Plan:  Possible posterior vitreous detachment (sudden onset large floater and/or flashing lights) both eyes discussed.   Continue observation with regard to glaucoma suspect status.   May use artificial tears up to 4 times daily both eyes. (Refresh Tears, Systane Ultra/Balance, or Theratears)   Glasses Rx given - optional   Return visit 6 months for intraocular pressure check, glaucoma OCT, retinal OCT and Rosas Visual Field.    Glynn Neal M.D.  [phone]               Again, thank you for allowing me to participate in the care of your patient.        Sincerely,        Glynn Neal MD

## 2021-11-16 ENCOUNTER — IMMUNIZATION (OUTPATIENT)
Dept: NURSING | Facility: CLINIC | Age: 71
End: 2021-11-16
Payer: MEDICARE

## 2021-11-16 PROCEDURE — 91300 PR COVID VAC PFIZER DIL RECON 30 MCG/0.3 ML IM: CPT

## 2021-11-16 PROCEDURE — 0001A PR COVID VAC PFIZER DIL RECON 30 MCG/0.3 ML IM: CPT

## 2021-11-21 ENCOUNTER — HEALTH MAINTENANCE LETTER (OUTPATIENT)
Age: 71
End: 2021-11-21

## 2021-12-07 ENCOUNTER — IMMUNIZATION (OUTPATIENT)
Dept: NURSING | Facility: CLINIC | Age: 71
End: 2021-12-07
Attending: FAMILY MEDICINE
Payer: MEDICARE

## 2021-12-07 PROCEDURE — 0002A PR COVID VAC PFIZER DIL RECON 30 MCG/0.3 ML IM: CPT

## 2021-12-07 PROCEDURE — 91300 PR COVID VAC PFIZER DIL RECON 30 MCG/0.3 ML IM: CPT

## 2022-01-04 ENCOUNTER — TRANSFERRED RECORDS (OUTPATIENT)
Dept: HEALTH INFORMATION MANAGEMENT | Facility: CLINIC | Age: 72
End: 2022-01-04
Payer: MEDICARE

## 2022-05-12 ENCOUNTER — OFFICE VISIT (OUTPATIENT)
Dept: OPHTHALMOLOGY | Facility: CLINIC | Age: 72
End: 2022-05-12
Payer: MEDICARE

## 2022-05-12 DIAGNOSIS — H40.003 GLAUCOMA SUSPECT OF BOTH EYES: Primary | ICD-10-CM

## 2022-05-12 PROCEDURE — 92133 CPTRZD OPH DX IMG PST SGM ON: CPT | Performed by: OPHTHALMOLOGY

## 2022-05-12 PROCEDURE — 92012 INTRM OPH EXAM EST PATIENT: CPT | Performed by: OPHTHALMOLOGY

## 2022-05-12 PROCEDURE — 92083 EXTENDED VISUAL FIELD XM: CPT | Performed by: OPHTHALMOLOGY

## 2022-05-12 ASSESSMENT — VISUAL ACUITY
OD_CC: 20/20
CORRECTION_TYPE: GLASSES
OS_CC: 20/20
METHOD: SNELLEN - LINEAR

## 2022-05-12 ASSESSMENT — TONOMETRY
OD_IOP_MMHG: 19
OS_IOP_MMHG: 17
IOP_METHOD: APPLANATION

## 2022-05-12 ASSESSMENT — SLIT LAMP EXAM - LIDS: COMMENTS: 1+ DERMATOCHALASIS - UPPER LID, 1+ SCLERAL SHOW, 1+ MEIBOMIAN GLAND DYSFUNCTION

## 2022-05-12 NOTE — PATIENT INSTRUCTIONS
Continue observation with regard to glaucoma suspect status.   Return visit in 6 months for a complete exam.  Glynn Neal M.D.  [phone]

## 2022-05-12 NOTE — LETTER
5/12/2022         RE: Lico Osullivan  [address] [facility] [address]        Dear Colleague,    Thank you for referring your patient, Lico Osullivan, to the [facility]. Please see a copy of my visit note below.     Current Eye Medications:  Artificial tears 1-2 times a week     Subjective:  Intraocular pressure check, OCT, Ret OCT and hvf     Both eyes doing well, no new issues or concerns.       Objective:  See Ophthalmology Exam.       Assessment:  Stable intraocular pressure, glaucoma OCT, and Rosas Visual Field both eyes in patient who is a glaucoma suspect.     Plan:  Continue observation with regard to glaucoma suspect status.   Return visit in 6 months for a complete exam.  Glynn Neal M.D.  [phone]          Again, thank you for allowing me to participate in the care of your patient.        Sincerely,        Glynn Neal MD

## 2022-05-12 NOTE — PROGRESS NOTES
Current Eye Medications:  Artificial tears 1-2 times a week     Subjective:  Intraocular pressure check, OCT, Ret OCT and hvf     Both eyes doing well, no new issues or concerns.       Objective:  See Ophthalmology Exam.       Assessment:  Stable intraocular pressure, glaucoma OCT, and Rosas Visual Field both eyes in patient who is a glaucoma suspect.     Plan:  Continue observation with regard to glaucoma suspect status.   Return visit in 6 months for a complete exam.  Glynn Neal M.D.  [phone]

## 2022-07-10 ASSESSMENT — PACHYMETRY
OS_CT(UM): 577
OD_CT(UM): 580

## 2022-07-17 ASSESSMENT — ENCOUNTER SYMPTOMS
WEAKNESS: 0
SORE THROAT: 0
DIARRHEA: 0
HEADACHES: 0
PALPITATIONS: 0
NAUSEA: 0
EYE PAIN: 0
HEARTBURN: 0
ABDOMINAL PAIN: 0
COUGH: 0
CHILLS: 0
DIZZINESS: 0
MYALGIAS: 0
DYSURIA: 0
HEMATOCHEZIA: 0
HEMATURIA: 0
SHORTNESS OF BREATH: 0
FEVER: 0
PARESTHESIAS: 0
CONSTIPATION: 0
NERVOUS/ANXIOUS: 0
ARTHRALGIAS: 0
FREQUENCY: 0
JOINT SWELLING: 0

## 2022-07-17 ASSESSMENT — ACTIVITIES OF DAILY LIVING (ADL): CURRENT_FUNCTION: NO ASSISTANCE NEEDED

## 2022-07-21 ENCOUNTER — OFFICE VISIT (OUTPATIENT)
Dept: FAMILY MEDICINE | Facility: CLINIC | Age: 72
End: 2022-07-21
Payer: MEDICARE

## 2022-07-21 VITALS
BODY MASS INDEX: 38.58 KG/M2 | OXYGEN SATURATION: 97 % | WEIGHT: 226 LBS | HEIGHT: 64 IN | SYSTOLIC BLOOD PRESSURE: 130 MMHG | HEART RATE: 58 BPM | TEMPERATURE: 97.2 F | DIASTOLIC BLOOD PRESSURE: 76 MMHG

## 2022-07-21 DIAGNOSIS — E66.01 SEVERE OBESITY (BMI 35.0-35.9 WITH COMORBIDITY) (H): ICD-10-CM

## 2022-07-21 DIAGNOSIS — C61 PROSTATE CA (H): ICD-10-CM

## 2022-07-21 DIAGNOSIS — E78.5 HYPERLIPIDEMIA LDL GOAL <130: Primary | ICD-10-CM

## 2022-07-21 DIAGNOSIS — I10 HYPERTENSION GOAL BP (BLOOD PRESSURE) < 140/90: ICD-10-CM

## 2022-07-21 DIAGNOSIS — R53.83 FATIGUE, UNSPECIFIED TYPE: ICD-10-CM

## 2022-07-21 LAB
ALBUMIN SERPL-MCNC: 3.6 G/DL (ref 3.4–5)
ALP SERPL-CCNC: 99 U/L (ref 40–150)
ALT SERPL W P-5'-P-CCNC: 40 U/L (ref 0–70)
ANION GAP SERPL CALCULATED.3IONS-SCNC: 3 MMOL/L (ref 3–14)
AST SERPL W P-5'-P-CCNC: 24 U/L (ref 0–45)
BASOPHILS # BLD AUTO: 0 10E3/UL (ref 0–0.2)
BASOPHILS NFR BLD AUTO: 0 %
BILIRUB SERPL-MCNC: 0.7 MG/DL (ref 0.2–1.3)
BUN SERPL-MCNC: 16 MG/DL (ref 7–30)
CALCIUM SERPL-MCNC: 9.1 MG/DL (ref 8.5–10.1)
CHLORIDE BLD-SCNC: 106 MMOL/L (ref 94–109)
CHOLEST SERPL-MCNC: 171 MG/DL
CO2 SERPL-SCNC: 33 MMOL/L (ref 20–32)
CREAT SERPL-MCNC: 0.93 MG/DL (ref 0.66–1.25)
EOSINOPHIL # BLD AUTO: 0.1 10E3/UL (ref 0–0.7)
EOSINOPHIL NFR BLD AUTO: 2 %
ERYTHROCYTE [DISTWIDTH] IN BLOOD BY AUTOMATED COUNT: 13.6 % (ref 10–15)
FASTING STATUS PATIENT QL REPORTED: YES
GFR SERPL CREATININE-BSD FRML MDRD: 88 ML/MIN/1.73M2
GLUCOSE BLD-MCNC: 120 MG/DL (ref 70–99)
HCT VFR BLD AUTO: 46.5 % (ref 40–53)
HDLC SERPL-MCNC: 55 MG/DL
HGB BLD-MCNC: 15.8 G/DL (ref 13.3–17.7)
LDLC SERPL CALC-MCNC: 92 MG/DL
LYMPHOCYTES # BLD AUTO: 1.5 10E3/UL (ref 0.8–5.3)
LYMPHOCYTES NFR BLD AUTO: 23 %
MCH RBC QN AUTO: 32.7 PG (ref 26.5–33)
MCHC RBC AUTO-ENTMCNC: 34 G/DL (ref 31.5–36.5)
MCV RBC AUTO: 96 FL (ref 78–100)
MONOCYTES # BLD AUTO: 0.7 10E3/UL (ref 0–1.3)
MONOCYTES NFR BLD AUTO: 11 %
NEUTROPHILS # BLD AUTO: 4.4 10E3/UL (ref 1.6–8.3)
NEUTROPHILS NFR BLD AUTO: 65 %
NONHDLC SERPL-MCNC: 116 MG/DL
PLATELET # BLD AUTO: 178 10E3/UL (ref 150–450)
POTASSIUM BLD-SCNC: 4.2 MMOL/L (ref 3.4–5.3)
PROT SERPL-MCNC: 6.8 G/DL (ref 6.8–8.8)
PSA SERPL-MCNC: 0.03 UG/L (ref 0–4)
RBC # BLD AUTO: 4.83 10E6/UL (ref 4.4–5.9)
SODIUM SERPL-SCNC: 142 MMOL/L (ref 133–144)
TRIGL SERPL-MCNC: 122 MG/DL
TSH SERPL DL<=0.005 MIU/L-ACNC: 2.52 MU/L (ref 0.4–4)
WBC # BLD AUTO: 6.8 10E3/UL (ref 4–11)

## 2022-07-21 PROCEDURE — 80061 LIPID PANEL: CPT | Performed by: INTERNAL MEDICINE

## 2022-07-21 PROCEDURE — G0439 PPPS, SUBSEQ VISIT: HCPCS | Performed by: INTERNAL MEDICINE

## 2022-07-21 PROCEDURE — 84153 ASSAY OF PSA TOTAL: CPT | Performed by: INTERNAL MEDICINE

## 2022-07-21 PROCEDURE — 36415 COLL VENOUS BLD VENIPUNCTURE: CPT | Performed by: INTERNAL MEDICINE

## 2022-07-21 PROCEDURE — 80050 GENERAL HEALTH PANEL: CPT | Performed by: INTERNAL MEDICINE

## 2022-07-21 ASSESSMENT — ENCOUNTER SYMPTOMS
SHORTNESS OF BREATH: 0
DYSURIA: 0
ARTHRALGIAS: 0
DIARRHEA: 0
PALPITATIONS: 0
NERVOUS/ANXIOUS: 0
DIZZINESS: 0
ABDOMINAL PAIN: 0
CONSTIPATION: 0
JOINT SWELLING: 0
FEVER: 0
FREQUENCY: 0
WEAKNESS: 0
HEADACHES: 0
EYE PAIN: 0
SORE THROAT: 0
HEMATURIA: 0
HEARTBURN: 0
HEMATOCHEZIA: 0
MYALGIAS: 0
CHILLS: 0
COUGH: 0
PARESTHESIAS: 0
NAUSEA: 0

## 2022-07-21 ASSESSMENT — ACTIVITIES OF DAILY LIVING (ADL): CURRENT_FUNCTION: NO ASSISTANCE NEEDED

## 2022-07-21 NOTE — PROGRESS NOTES
"SUBJECTIVE:   Lico Osullivan is a 72 year old male who presents for Preventive Visit.      Patient has been advised of split billing requirements and indicates understanding: Yes  Are you in the first 12 months of your Medicare coverage?  No    Healthy Habits:     In general, how would you rate your overall health?  Fair    Frequency of exercise:  None    Do you usually eat at least 4 servings of fruit and vegetables a day, include whole grains    & fiber and avoid regularly eating high fat or \"junk\" foods?  Yes    Taking medications regularly:  Yes    Medication side effects:  None    Ability to successfully perform activities of daily living:  No assistance needed    Home Safety:  No safety concerns identified    Hearing Impairment:  Need to ask people to speak up or repeat themselves    In the past 6 months, have you been bothered by leaking of urine?  No    In general, how would you rate your overall mental or emotional health?  Good      PHQ-2 Total Score: 0      In June had a bad cold -   lingered for several days   Cough into the lungs - limited feer   Not getting out much   Goes shopping and to [facility]   Prostate cancer was taken out   Had a small amount of hearing loss   Tinnitus in the left ear   Hearing the blood pumping       Do you feel safe in your environment? Yes    Have you ever done Advance Care Planning? (For example, a Health Directive, POLST, or a discussion with a medical provider or your loved ones about your wishes): No, advance care planning information given to patient to review.  Advanced care planning was discussed at today's visit.       Fall risk  Fallen 2 or more times in the past year?: No  Any fall with injury in the past year?: No    Cognitive Screening   1) Repeat 3 items (Leader, Season, Table)    2) Clock draw: NORMAL  3) 3 item recall: Recalls 3 objects  Results: 3 items recalled: COGNITIVE IMPAIRMENT LESS LIKELY    Mini-CogTM Copyright S Freda. Licensed by the author for use in " [facility]; reprinted with permission (alexandro@[facility]). All rights reserved.      Do you have sleep apnea, excessive snoring or daytime drowsiness?: no    Reviewed and updated as needed this visit by clinical staff   Tobacco  Allergies  Meds   Med Hx  Surg Hx  Fam Hx  Soc Hx          Reviewed and updated as needed this visit by Provider                   Social History     Tobacco Use     Smoking status: Former Smoker     Packs/day: 1.00     Years: 0.00     Pack years: 0.00     Types: Cigarettes     Quit date: 9/10/1994     Years since quittin.8     Smokeless tobacco: Never Used     Tobacco comment: smoke free household.   Substance Use Topics     Alcohol use: Yes     Alcohol/week: 6.0 standard drinks     Comment: occ.     If you drink alcohol do you typically have >3 drinks per day or >7 drinks per week? No    Alcohol Use 2022   Prescreen: >3 drinks/day or >7 drinks/week? No   Prescreen: >3 drinks/day or >7 drinks/week? -   AUDIT SCORE  -     AUDIT - Alcohol Use Disorders Identification Test - Reproduced from the World Health Organization Audit 2001 (Second Edition) 2020   1.  How often do you have a drink containing alcohol? 4 or more times a week   2.  How many drinks containing alcohol do you have on a typical day when you are drinking? 1 or 2   3.  How often do you have five or more drinks on one occasion? Never   4.  How often during the last year have you found that you were not able to stop drinking once you had started? Never   5.  How often during the last year have you failed to do what was normally expected of you because of drinking? Never   6.  How often during the last year have you needed a first drink in the morning to get yourself going after a heavy drinking session? Never   7.  How often during the last year have you had a feeling of guilt or remorse after drinking? Less than monthly   8.  How often during the last year have you been unable to remember what  happened the night before because of your drinking? Never   9.  Have you or someone else been injured because of your drinking? No   10. Has a relative, friend, doctor or other health care worker been concerned about your drinking or suggested you cut down? No   TOTAL SCORE 5               Current providers sharing in care for this patient include:   Patient Care Team:  Albaro Lassiter MD as PCP - General (Internal Medicine - Pediatrics)  Glynn Neal MD as Assigned Surgical Provider  Albaro Lassiter MD as Assigned PCP    The following health maintenance items are reviewed in Epic and correct as of today:  Health Maintenance Due   Topic Date Due     AORTIC ANEURYSM SCREENING (SYSTEM ASSIGNED)  Never done     ZOSTER IMMUNIZATION (2 of 3) 11/20/2016     COVID-19 Vaccine (3 - Booster for Pfizer series) 05/07/2022     Lab work is in process  Labs reviewed in EPIC  BP Readings from Last 3 Encounters:   07/21/22 130/76   10/11/21 134/80   09/17/20 132/78    Wt Readings from Last 3 Encounters:   07/21/22 102.5 kg (226 lb)   10/11/21 103 kg (227 lb)   09/17/20 102.1 kg (225 lb)                  Patient Active Problem List   Diagnosis     Prostate CA (H)     Colon polyp     HYPERLIPIDEMIA LDL GOAL <130     Advanced directives, counseling/discussion     Hypertension goal BP (blood pressure) < 140/90     Vitreous condensations, os     Pseuophakia, Yag Caps, ou     Severe obesity (BMI 35.0-35.9 with comorbidity) (H)     Hx of retinal hemorrhage of left eye, remotely     Epiretinal membrane, mild, of right eye     Retinal hemorrhage of right eye     Glaucoma suspect of both eyes     Past Surgical History:   Procedure Laterality Date     CATARACT IOL, RT/LT       CL AFF SURGICAL PATHOLOGY      2006     COLONOSCOPY  8/9/2011    Procedure:COMBINED COLONOSCOPY, REMOVE TUMOR/POLYP/LESION BY SNARE; Surgeon:ALBARO LANGFORD; Location:[facility] OR     LASER YAG CAPSULOTOMY  10/2015; 11/2016    left eye; right eye      PHACOEMULSIFICATION WITH STANDARD INTRAOCULAR LENS IMPLANT  10/2012; 2014    left eye; right eye     SUPRAPUBIC PROSTATECTOMY         Social History     Tobacco Use     Smoking status: Former Smoker     Packs/day: 1.00     Years: 0.00     Pack years: 0.00     Types: Cigarettes     Quit date: 9/10/1994     Years since quittin.8     Smokeless tobacco: Never Used     Tobacco comment: smoke free household.   Substance Use Topics     Alcohol use: Yes     Alcohol/week: 6.0 standard drinks     Comment: occ.     Family History   Problem Relation Age of Onset     C.A.D. Father      Psychotic Disorder Father         schizophrenia     Prostate Cancer Father         full surgery     Diabetes Father      Asthma Other         nephew     Prostate Cancer Paternal Grandfather      Cerebrovascular Disease Paternal Grandfather      Hypertension Sister      Cataracts Sister      Psychotic Disorder Sister         schizophrenia     Prostate Cancer Maternal Grandmother      Prostate Cancer Maternal Grandfather         partial surgery     Asthma Other      Cataracts Mother      Asthma Nephew         childhood case         Current Outpatient Medications   Medication Sig Dispense Refill     Acetaminophen (TYLENOL PO) As needed       aspirin 325 MG EC tablet Take  by mouth daily. 1 tablet daily         hydrochlorothiazide (HYDRODIURIL) 12.5 MG tablet Take 1 tablet (12.5 mg) by mouth daily 90 tablet 4     lisinopril (ZESTRIL) 20 MG tablet Take 1 tablet (20 mg) by mouth daily 90 tablet 4     simvastatin (ZOCOR) 20 MG tablet Take 1 tablet (20 mg) by mouth At Bedtime 90 tablet 4     No Known Allergies          Review of Systems   Constitutional: Negative for chills and fever.   HENT: Positive for hearing loss. Negative for congestion, ear pain and sore throat.    Eyes: Negative for pain and visual disturbance.   Respiratory: Negative for cough and shortness of breath.    Cardiovascular: Negative for chest pain, palpitations and  "peripheral edema.   Gastrointestinal: Negative for abdominal pain, constipation, diarrhea, heartburn, hematochezia and nausea.   Genitourinary: Negative for dysuria, frequency, genital sores, hematuria, impotence, penile discharge and urgency.   Musculoskeletal: Negative for arthralgias, joint swelling and myalgias.   Skin: Negative for rash.   Neurological: Negative for dizziness, weakness, headaches and paresthesias.   Psychiatric/Behavioral: Negative for mood changes. The patient is not nervous/anxious.      Constitutional, HEENT, cardiovascular, pulmonary, gi and gu systems are negative, except as otherwise noted.    OBJECTIVE:   /76 (BP Location: Left arm, Patient Position: Chair, Cuff Size: Adult Large)   Pulse 58   Temp 97.2  F (36.2  C) (Temporal)   Ht 1.63 m (5' 4.17\")   Wt 102.5 kg (226 lb)   SpO2 97%   BMI 38.58 kg/m   Estimated body mass index is 38.58 kg/m  as calculated from the following:    Height as of this encounter: 1.63 m (5' 4.17\").    Weight as of this encounter: 102.5 kg (226 lb).  Physical Exam  GENERAL: healthy, alert and no distress  EYES: Eyes grossly normal to inspection, PERRL and conjunctivae and sclerae normal  HENT: ear canals and TM's normal, nose and mouth without ulcers or lesions  NECK: no adenopathy, no asymmetry, masses, or scars and thyroid normal to palpation  RESP: lungs clear to auscultation - no rales, rhonchi or wheezes  CV: regular rate and rhythm, normal S1 S2, no S3 or S4, no murmur, click or rub, no peripheral edema and peripheral pulses strong  ABDOMEN: soft, nontender, no hepatosplenomegaly, no masses and bowel sounds normal  MS: no gross musculoskeletal defects noted, no edema  SKIN: no suspicious lesions or rashes  NEURO: Normal strength and tone, mentation intact and speech normal  BACK: no CVA tenderness, no paralumbar tenderness  PSYCH: mentation appears normal, affect normal/bright  LYMPH: no cervical, supraclavicular, axillary, or inguinal " "adenopathy    Diagnostic Test Results:  Labs reviewed in Epic  No results found for any visits on 07/21/22.    ASSESSMENT / PLAN:   Lico was seen today for physical.    Diagnoses and all orders for this visit:    Hyperlipidemia LDL goal <130  -     Comprehensive metabolic panel (BMP + Alb, Alk Phos, ALT, AST, Total. Bili, TP); Future  -     Lipid panel reflex to direct LDL Fasting; Future    Severe obesity (BMI 35.0-35.9 with comorbidity) (H)    Prostate CA (H)  -     CBC with platelets and differential; Future  -     PSA, tumor marker; Future    Hypertension goal BP (blood pressure) < 140/90  -     Comprehensive metabolic panel (BMP + Alb, Alk Phos, ALT, AST, Total. Bili, TP); Future  -     Lipid panel reflex to direct LDL Fasting; Future            COUNSELING:  Reviewed preventive health counseling, as reflected in patient instructions       Regular exercise       Healthy diet/nutrition       Vision screening       Hearing screening       Dental care    Estimated body mass index is 38.58 kg/m  as calculated from the following:    Height as of this encounter: 1.63 m (5' 4.17\").    Weight as of this encounter: 102.5 kg (226 lb).    Weight management plan: Discussed healthy diet and exercise guidelines    He reports that he quit smoking about 27 years ago. His smoking use included cigarettes. He smoked 1.00 pack per day for 0.00 years. He has never used smokeless tobacco.      Appropriate preventive services were discussed with this patient, including applicable screening as appropriate for cardiovascular disease, diabetes, osteopenia/osteoporosis, and glaucoma.  As appropriate for age/gender, discussed screening for colorectal cancer, prostate cancer, breast cancer, and cervical cancer. Checklist reviewing preventive services available has been given to the patient.    Reviewed patients plan of care and provided an AVS. The Basic Care Plan (routine screening as documented in Health Maintenance) for Lico meets " the Care Plan requirement. This Care Plan has been established and reviewed with the Patient.    Counseling Resources:  ATP IV Guidelines  Pooled Cohorts Equation Calculator  Breast Cancer Risk Calculator  Breast Cancer: Medication to Reduce Risk  FRAX Risk Assessment  ICSI Preventive Guidelines  Dietary Guidelines for Americans, 2010  USDA's MyPlate  ASA Prophylaxis  Lung CA Screening    Demetrius Lassiter MD  [facility]    Identified Health Risks:

## 2022-11-09 ENCOUNTER — TELEPHONE (OUTPATIENT)
Dept: FAMILY MEDICINE | Facility: CLINIC | Age: 72
End: 2022-11-09

## 2022-11-09 NOTE — TELEPHONE ENCOUNTER
Reason for Call:  Other appointment    Detailed comments: Patient  Needs a hospital follow up in 3-5 days. Looked to schedule but Dr. Lassiter has nothing in this time frame. Please advise     Phone Number Patient can be reached at:   Home Phone [phone]   Cell Phone [phone]    Best Time: day    Can we leave a detailed message on this number?  yes    Call taken on 11/9/2022 at 2:01 PM by Jazz Millard

## 2022-11-14 ENCOUNTER — OFFICE VISIT (OUTPATIENT)
Dept: FAMILY MEDICINE | Facility: CLINIC | Age: 72
End: 2022-11-14
Payer: MEDICARE

## 2022-11-14 ENCOUNTER — ANCILLARY PROCEDURE (OUTPATIENT)
Dept: GENERAL RADIOLOGY | Facility: CLINIC | Age: 72
End: 2022-11-14
Attending: INTERNAL MEDICINE
Payer: MEDICARE

## 2022-11-14 VITALS
OXYGEN SATURATION: 98 % | SYSTOLIC BLOOD PRESSURE: 142 MMHG | BODY MASS INDEX: 38.41 KG/M2 | TEMPERATURE: 98.1 F | HEART RATE: 89 BPM | WEIGHT: 225 LBS | DIASTOLIC BLOOD PRESSURE: 86 MMHG

## 2022-11-14 DIAGNOSIS — S69.92XA HAND INJURY, LEFT, INITIAL ENCOUNTER: ICD-10-CM

## 2022-11-14 DIAGNOSIS — R19.00 RETROPERITONEAL MASS: Primary | ICD-10-CM

## 2022-11-14 DIAGNOSIS — H81.13 BENIGN PAROXYSMAL POSITIONAL VERTIGO DUE TO BILATERAL VESTIBULAR DISORDER: ICD-10-CM

## 2022-11-14 DIAGNOSIS — R26.81 UNSTEADY GAIT: ICD-10-CM

## 2022-11-14 DIAGNOSIS — I10 HYPERTENSION GOAL BP (BLOOD PRESSURE) < 140/90: ICD-10-CM

## 2022-11-14 PROCEDURE — 73130 X-RAY EXAM OF HAND: CPT | Mod: TC | Performed by: RADIOLOGY

## 2022-11-14 PROCEDURE — 99214 OFFICE O/P EST MOD 30 MIN: CPT | Performed by: INTERNAL MEDICINE

## 2022-11-14 RX ORDER — LISINOPRIL 40 MG/1
40 TABLET ORAL DAILY
Qty: 90 TABLET | Refills: 4 | Status: SHIPPED | OUTPATIENT
Start: 2022-11-14 | End: 2023-04-17

## 2022-11-14 NOTE — PROGRESS NOTES
Assessment & Plan   Problem List Items Addressed This Visit     Hypertension goal BP (blood pressure) < 140/90    Relevant Medications    lisinopril (ZESTRIL) 40 MG tablet   Other Visit Diagnoses     Retroperitoneal mass    -  Primary    Relevant Orders    CT Abdomen Pelvis w Contrast    Hand injury, left, initial encounter        Relevant Orders    XR Hand Left G/E 3 Views (Completed)    Benign paroxysmal positional vertigo due to bilateral vestibular disorder        Relevant Orders    Physical Therapy Referral    Unsteady gait        Relevant Orders    Physical Therapy Referral                MED REC REQUIRED  Post Medication Reconciliation Status:       Work on weight loss  Regular exercise    No follow-ups on file.    Demetrius Lassiter MD  [facility] DARIEN Barfield is a 72 year old{ presenting for the following health issues:  Hospital F/U ([facility] )      HPI     ED/UC Followup:    Facility:  [facility]  Date of visit: 11/8/22  Reason for visit: Dizziness  Current Status:   Still having episodes of dizziness and lightheadedness  Recent MRI of the brain showed small vessel disease  Hissing and rhythmic   Vertigo - horizontal   Sometimes moving side to side. Has not happened with driving   Had cold symptoms - corticadin -   Base ingredient is tylenol   Blood pressure running higher   Was working at the election and brought food .  White count was elevated   Got wound in the hand       Review of Systems   Constitutional, HEENT, cardiovascular, pulmonary, gi and gu systems are negative, except as otherwise noted.      Objective    BP (!) 142/86   Pulse 89   Temp 98.1  F (36.7  C) (Oral)   Wt 102.1 kg (225 lb)   SpO2 98%   BMI 38.41 kg/m    Body mass index is 38.41 kg/m .  Physical Exam   GENERAL: healthy, alert and no distress  EYES: Eyes grossly normal to inspection, PERRL and conjunctivae and sclerae normal  HENT: ear canals and TM's normal, nose and mouth without ulcers or  lesions  NECK: no adenopathy, no asymmetry, masses, or scars and thyroid normal to palpation  RESP: lungs clear to auscultation - no rales, rhonchi or wheezes  CV: regular rate and rhythm, normal S1 S2, no S3 or S4, no murmur, click or rub, no peripheral edema and peripheral pulses strong  ABDOMEN: soft, nontender, no hepatosplenomegaly, no masses and bowel sounds normal  MS: no gross musculoskeletal defects noted, no edema  SKIN: no suspicious lesions or rashes  NEURO: Normal strength and tone, mentation intact and speech normal  BACK: no CVA tenderness, no paralumbar tenderness    Results for orders placed or performed in visit on 11/14/22   XR Hand Left G/E 3 Views     Status: None    Narrative    XR HAND LEFT G/E 3 VIEWS 11/14/2022 9:18 AM     HISTORY: Hand injury, left, initial encounter    COMPARISON: None.       Impression    IMPRESSION: There is an acute appearing slightly impacted  intra-articular base of the fifth metacarpal fracture with adjacent  soft tissue swelling. Scattered arthritic changes in the left hand  which are severe at the first CMC joint.    THERON VENEGAS MD         SYSTEM ID:  [id number]

## 2022-11-14 NOTE — PATIENT INSTRUCTIONS
For scheduling at [facility] ([facility], [facility], [place]), call [phone] or [phone]     For scheduling in the [place] ([facility], [facility]) call  [phone] or  [phone]        For scheduling in the South ([facility], [facility]) call [phone]  or   [phone]

## 2022-11-16 ENCOUNTER — OFFICE VISIT (OUTPATIENT)
Dept: OPHTHALMOLOGY | Facility: CLINIC | Age: 72
End: 2022-11-16
Payer: MEDICARE

## 2022-11-16 DIAGNOSIS — H35.62 RETINAL HEMORRHAGE OF LEFT EYE: ICD-10-CM

## 2022-11-16 DIAGNOSIS — H40.003 GLAUCOMA SUSPECT OF BOTH EYES: ICD-10-CM

## 2022-11-16 DIAGNOSIS — Z96.1 PSEUDOPHAKIA: Primary | ICD-10-CM

## 2022-11-16 DIAGNOSIS — H43.399 VITREOUS OPACITIES: ICD-10-CM

## 2022-11-16 DIAGNOSIS — H52.4 PRESBYOPIA: ICD-10-CM

## 2022-11-16 DIAGNOSIS — H35.371 EPIRETINAL MEMBRANE (ERM) OF RIGHT EYE: ICD-10-CM

## 2022-11-16 DIAGNOSIS — Z01.01 ENCOUNTER FOR EXAMINATION OF EYES AND VISION WITH ABNORMAL FINDINGS: ICD-10-CM

## 2022-11-16 PROCEDURE — 92015 DETERMINE REFRACTIVE STATE: CPT | Performed by: OPHTHALMOLOGY

## 2022-11-16 PROCEDURE — 92014 COMPRE OPH EXAM EST PT 1/>: CPT | Performed by: OPHTHALMOLOGY

## 2022-11-16 ASSESSMENT — SLIT LAMP EXAM - LIDS: COMMENTS: 1+ DERMATOCHALASIS - UPPER LID, 1+ SCLERAL SHOW, 1+ MEIBOMIAN GLAND DYSFUNCTION

## 2022-11-16 ASSESSMENT — CONF VISUAL FIELD
OS_SUPERIOR_NASAL_RESTRICTION: 0
OD_INFERIOR_TEMPORAL_RESTRICTION: 0
OS_NORMAL: 1
OD_SUPERIOR_NASAL_RESTRICTION: 0
OD_NORMAL: 1
OS_INFERIOR_TEMPORAL_RESTRICTION: 0
OS_INFERIOR_NASAL_RESTRICTION: 0
OD_INFERIOR_NASAL_RESTRICTION: 0
OS_SUPERIOR_TEMPORAL_RESTRICTION: 0
METHOD: COUNTING FINGERS
OD_SUPERIOR_TEMPORAL_RESTRICTION: 0

## 2022-11-16 ASSESSMENT — REFRACTION_MANIFEST
OD_ADD: +3.00
OS_CYLINDER: +0.25
OS_SPHERE: -1.75
OS_AXIS: 145
OD_CYLINDER: SPHERE
OS_ADD: +3.00
OD_SPHERE: -1.00

## 2022-11-16 ASSESSMENT — CUP TO DISC RATIO
OD_RATIO: 0.3
OS_RATIO: 0.6

## 2022-11-16 ASSESSMENT — REFRACTION_WEARINGRX
OD_ADD: +3.00
OD_CYLINDER: SPHERE
SPECS_TYPE: PAL
OS_SPHERE: -1.50
OS_ADD: +3.00
OD_SPHERE: -0.75
OS_CYLINDER: +0.25
OS_AXIS: 037

## 2022-11-16 ASSESSMENT — VISUAL ACUITY
OD_CC: 20/20
METHOD: SNELLEN - LINEAR
OS_CC: 20/20
OS_CC+: -2
CORRECTION_TYPE: GLASSES

## 2022-11-16 ASSESSMENT — TONOMETRY
OD_IOP_MMHG: 20
OS_IOP_MMHG: 20
IOP_METHOD: APPLANATION

## 2022-11-16 NOTE — LETTER
"    11/16/2022         RE: Lico Osullivan  [address] [facility] [address]        Dear Colleague,    Thank you for referring your patient, Lico Osullivan, to the [facility]. Please see a copy of my visit note below.     Current Eye Medications:  Has liquid tears uses as needed both eyes, hasn't used for a couple of days. Clear eyes (redness relief) Hardly ever uses.     Subjective:  Complete eye exam. Vision is doing OK both eyes distance and near. No eye pain or discomfort in either eye. 1 week ago patient got dizzy and fell down, was seen in ER. Dr. Lassiter doubled dose of Lisinopril from 20 mg to 40 mg.      Objective:  See Ophthalmology Exam.       Assessment:  Stable eye exam.      ICD-10-CM    1. Pseuophakia, Yag Caps, ou  Z96.1       2. Glaucoma suspect of both eyes  H40.003       3. Hx of retinal hemorrhage of left eye, remotely  H35.62       4. Epiretinal membrane, mild, of right eye  H35.371       5. Vitreous condensations, os  H43.399       6. Encounter for examination of eyes and vision with abnormal findings  Z01.01       7. Presbyopia  H52.4            Plan:  Glasses prescription given - optional  May use artificial tears up to four times a day (like Refresh Optive, Systane Balance, TheraTears, or generic artificial tears are ok. Avoid \"get the red out\" drops).   Possible posterior vitreous detachment (sudden onset large floater and/or flashing lights) both eyes discussed.   Call in July 2023 for an appointment in November 2023 for Complete Exam    Dr. Neal [phone]             Again, thank you for allowing me to participate in the care of your patient.        Sincerely,        Glynn Neal MD    "

## 2022-11-16 NOTE — PROGRESS NOTES
" Current Eye Medications:  Has liquid tears uses as needed both eyes, hasn't used for a couple of days. Clear eyes (redness relief) Hardly ever uses.     Subjective:  Complete eye exam. Vision is doing OK both eyes distance and near. No eye pain or discomfort in either eye. 1 week ago patient got dizzy and fell down, was seen in ER. Dr. Lassiter doubled dose of Lisinopril from 20 mg to 40 mg.      Objective:  See Ophthalmology Exam.       Assessment:  Stable eye exam.      ICD-10-CM    1. Pseuophakia, Yag Caps, ou  Z96.1       2. Glaucoma suspect of both eyes  H40.003       3. Hx of retinal hemorrhage of left eye, remotely  H35.62       4. Epiretinal membrane, mild, of right eye  H35.371       5. Vitreous condensations, os  H43.399       6. Encounter for examination of eyes and vision with abnormal findings  Z01.01       7. Presbyopia  H52.4            Plan:  Glasses prescription given - optional  May use artificial tears up to four times a day (like Refresh Optive, Systane Balance, TheraTears, or generic artificial tears are ok. Avoid \"get the red out\" drops).   Possible posterior vitreous detachment (sudden onset large floater and/or flashing lights) both eyes discussed.   Call in July 2023 for an appointment in November 2023 for Complete Exam    Dr. Neal [phone]         "

## 2022-11-16 NOTE — PATIENT INSTRUCTIONS
"Glasses prescription given - optional  May use artificial tears up to four times a day (like Refresh Optive, Systane Balance, TheraTears, or generic artificial tears are ok. Avoid \"get the red out\" drops).   Possible posterior vitreous detachment (sudden onset large floater and/or flashing lights) both eyes discussed.   Call in July 2023 for an appointment in November 2023 for Complete Exam    Dr. Neal [phone]    "

## 2022-11-21 ENCOUNTER — ANCILLARY PROCEDURE (OUTPATIENT)
Dept: CT IMAGING | Facility: CLINIC | Age: 72
End: 2022-11-21
Attending: INTERNAL MEDICINE
Payer: MEDICARE

## 2022-11-21 DIAGNOSIS — R19.00 RETROPERITONEAL MASS: ICD-10-CM

## 2022-11-21 PROCEDURE — G1010 CDSM STANSON: HCPCS | Mod: TC | Performed by: RADIOLOGY

## 2022-11-21 PROCEDURE — 74177 CT ABD & PELVIS W/CONTRAST: CPT | Mod: TC | Performed by: RADIOLOGY

## 2022-11-21 RX ORDER — IOPAMIDOL 755 MG/ML
500 INJECTION, SOLUTION INTRAVASCULAR ONCE
Status: COMPLETED | OUTPATIENT
Start: 2022-11-21 | End: 2022-11-21

## 2022-11-21 RX ADMIN — IOPAMIDOL 122 ML: 755 INJECTION, SOLUTION INTRAVASCULAR at 14:03

## 2022-11-21 RX ADMIN — Medication 79 ML: at 14:03

## 2022-11-22 LAB
CREAT BLD-MCNC: 1 MG/DL (ref 0.7–1.3)
GFR SERPL CREATININE-BSD FRML MDRD: >60 ML/MIN/1.73M2

## 2022-11-22 PROCEDURE — 82565 ASSAY OF CREATININE: CPT

## 2022-12-14 DIAGNOSIS — E78.5 HYPERLIPIDEMIA LDL GOAL <130: ICD-10-CM

## 2022-12-14 DIAGNOSIS — I10 ESSENTIAL HYPERTENSION WITH GOAL BLOOD PRESSURE LESS THAN 140/90: ICD-10-CM

## 2022-12-14 DIAGNOSIS — I10 HYPERTENSION GOAL BP (BLOOD PRESSURE) < 140/90: ICD-10-CM

## 2022-12-14 RX ORDER — SIMVASTATIN 20 MG
TABLET ORAL
Qty: 90 TABLET | Refills: 4 | Status: SHIPPED | OUTPATIENT
Start: 2022-12-14 | End: 2023-04-14

## 2022-12-14 RX ORDER — LISINOPRIL 20 MG/1
TABLET ORAL
Qty: 90 TABLET | Refills: 4 | OUTPATIENT
Start: 2022-12-14

## 2022-12-14 RX ORDER — HYDROCHLOROTHIAZIDE 12.5 MG/1
TABLET ORAL
Qty: 90 TABLET | Refills: 4 | Status: SHIPPED | OUTPATIENT
Start: 2022-12-14 | End: 2023-04-14

## 2023-02-24 ENCOUNTER — TELEPHONE (OUTPATIENT)
Dept: FAMILY MEDICINE | Facility: CLINIC | Age: 73
End: 2023-02-24
Payer: MEDICARE

## 2023-02-24 NOTE — TELEPHONE ENCOUNTER
Patient Quality Outreach      Summary:    Patient has the following on his problem list/HM:     Hypertension   Last three blood pressure readings:  BP Readings from Last 3 Encounters:   11/14/22 (!) 142/86   07/21/22 130/76   10/11/21 134/80     Blood pressure: Failed    HTN Guidelines:  ? 139/89     Patient is due/failing the following:   Hypertension -  Hypertension follow-up visit      Topic Date Due     Zoster (Shingles) Vaccine (2 of 3) 11/20/2016     COVID-19 Vaccine (3 - Booster for Pfizer series) 02/01/2022     Flu Vaccine (1) Never done       Type of outreach:    Sent eMoov message.    Questions for provider review:    None                                              Soni Velarde MA     Chart routed to Care Team.

## 2023-03-03 NOTE — TELEPHONE ENCOUNTER
Patient Quality Outreach 2nd Attempt      Summary:    Type of outreach:    Pt schedule follow up for 4/10/23    Next Steps:  Reach out within 90 days via Phone.    Max number of attempts reached: Yes. Will try again in 90 days if patient still on fail list.    Questions for provider review:    None                                                                                                                    Soni Velarde MA     Chart routed to Care Team.

## 2023-03-31 ENCOUNTER — TRANSFERRED RECORDS (OUTPATIENT)
Dept: HEALTH INFORMATION MANAGEMENT | Facility: CLINIC | Age: 73
End: 2023-03-31

## 2023-04-10 ENCOUNTER — OFFICE VISIT (OUTPATIENT)
Dept: FAMILY MEDICINE | Facility: CLINIC | Age: 73
End: 2023-04-10
Payer: COMMERCIAL

## 2023-04-10 VITALS
HEART RATE: 71 BPM | SYSTOLIC BLOOD PRESSURE: 124 MMHG | RESPIRATION RATE: 20 BRPM | TEMPERATURE: 97.6 F | DIASTOLIC BLOOD PRESSURE: 78 MMHG | HEIGHT: 64 IN | WEIGHT: 222 LBS | OXYGEN SATURATION: 96 % | BODY MASS INDEX: 37.9 KG/M2

## 2023-04-10 DIAGNOSIS — H91.92 DECREASED HEARING, LEFT: ICD-10-CM

## 2023-04-10 DIAGNOSIS — R42 VERTIGO: Primary | ICD-10-CM

## 2023-04-10 DIAGNOSIS — E78.5 HYPERLIPIDEMIA LDL GOAL <130: ICD-10-CM

## 2023-04-10 DIAGNOSIS — E66.01 SEVERE OBESITY (BMI 35.0-35.9 WITH COMORBIDITY) (H): ICD-10-CM

## 2023-04-10 DIAGNOSIS — I10 HYPERTENSION GOAL BP (BLOOD PRESSURE) < 140/90: ICD-10-CM

## 2023-04-10 DIAGNOSIS — C61 PROSTATE CA (H): ICD-10-CM

## 2023-04-10 DIAGNOSIS — N28.1 RENAL CYST: ICD-10-CM

## 2023-04-10 LAB
ALBUMIN SERPL-MCNC: 3.5 G/DL (ref 3.4–5)
ALP SERPL-CCNC: 95 U/L (ref 40–150)
ALT SERPL W P-5'-P-CCNC: 32 U/L (ref 0–70)
ANION GAP SERPL CALCULATED.3IONS-SCNC: 3 MMOL/L (ref 3–14)
AST SERPL W P-5'-P-CCNC: 21 U/L (ref 0–45)
BILIRUB SERPL-MCNC: 0.7 MG/DL (ref 0.2–1.3)
BUN SERPL-MCNC: 17 MG/DL (ref 7–30)
CALCIUM SERPL-MCNC: 9 MG/DL (ref 8.5–10.1)
CHLORIDE BLD-SCNC: 105 MMOL/L (ref 94–109)
CHOLEST SERPL-MCNC: 162 MG/DL
CO2 SERPL-SCNC: 28 MMOL/L (ref 20–32)
CREAT SERPL-MCNC: 0.91 MG/DL (ref 0.66–1.25)
FASTING STATUS PATIENT QL REPORTED: NORMAL
GFR SERPL CREATININE-BSD FRML MDRD: 90 ML/MIN/1.73M2
GLUCOSE BLD-MCNC: 122 MG/DL (ref 70–99)
HDLC SERPL-MCNC: 60 MG/DL
LDLC SERPL CALC-MCNC: 81 MG/DL
NONHDLC SERPL-MCNC: 102 MG/DL
POTASSIUM BLD-SCNC: 4 MMOL/L (ref 3.4–5.3)
PROT SERPL-MCNC: 6.6 G/DL (ref 6.8–8.8)
SODIUM SERPL-SCNC: 136 MMOL/L (ref 133–144)
TRIGL SERPL-MCNC: 107 MG/DL

## 2023-04-10 PROCEDURE — 99214 OFFICE O/P EST MOD 30 MIN: CPT | Performed by: INTERNAL MEDICINE

## 2023-04-10 PROCEDURE — 80053 COMPREHEN METABOLIC PANEL: CPT | Performed by: INTERNAL MEDICINE

## 2023-04-10 PROCEDURE — 36415 COLL VENOUS BLD VENIPUNCTURE: CPT | Performed by: INTERNAL MEDICINE

## 2023-04-10 PROCEDURE — 80061 LIPID PANEL: CPT | Performed by: INTERNAL MEDICINE

## 2023-04-10 RX ORDER — MECLIZINE HYDROCHLORIDE 25 MG/1
25 TABLET ORAL 3 TIMES DAILY PRN
COMMUNITY
Start: 2022-11-09 | End: 2023-11-01

## 2023-04-10 ASSESSMENT — ENCOUNTER SYMPTOMS: HYPERTENSION: 1

## 2023-04-10 NOTE — PROGRESS NOTES
"  Assessment & Plan   Problem List Items Addressed This Visit     HYPERLIPIDEMIA LDL GOAL <130    Relevant Orders    Lipid panel reflex to direct LDL Fasting (Completed)    Hypertension goal BP (blood pressure) < 140/90    Relevant Orders    Comprehensive metabolic panel (BMP + Alb, Alk Phos, ALT, AST, Total. Bili, TP) (Completed)    Home Blood Pressure Monitor Order for DME - ONLY FOR DME    Prostate CA (H)    Severe obesity (BMI 35.0-35.9 with comorbidity) (H)   Other Visit Diagnoses     Vertigo    -  Primary    Relevant Medications    meclizine (ANTIVERT) 25 MG tablet    Decreased hearing, left        Relevant Orders    REVIEW OF HEALTH MAINTENANCE PROTOCOL ORDERS (Completed)    Adult ENT  Referral    Renal cyst        Relevant Orders    US Renal Complete Non-Vascular                  BMI:   Estimated body mass index is 37.99 kg/m  as calculated from the following:    Height as of this encounter: 1.628 m (5' 4.1\").    Weight as of this encounter: 100.7 kg (222 lb).   Weight management plan: Discussed healthy diet and exercise guidelines    Work on weight loss  Regular exercise    Demetrius Lassiter MD  [facility] DARIEN Barfield is a 72 year old, presenting for the following health issues:  Hypertension        4/10/2023     9:19 AM   Additional Questions   Roomed by Suzi     History of Present Illness       Hypertension: He presents for follow up of hypertension.  He does not check blood pressure  regularly outside of the clinic. Outside blood pressures have been over 140/90. He does not follow a low salt diet.     He eats 2-3 servings of fruits and vegetables daily.He consumes 1 sweetened beverage(s) daily.He exercises with enough effort to increase his heart rate 9 or less minutes per day.  He exercises with enough effort to increase his heart rate 3 or less days per week.   He is taking medications regularly.     Anti vertigo medication 5 times   Triggered  By various -   " "Turned around into the door   At home   Meclizine makes drowsy - keeps some with him   Renal areas upper poles of the kidneys   MRI contrast abdomen   Sometimes congested   Drinking water makes them improve or get better   Medications working .  Tinnitus in the left ear ( since the last fall been fairly strong )  Tried CBD chew -   Did not help for the tinnitus  - sometimes helped to sleep  Laying on the left side left arm goes to sleep   Lays on the right side - cannot hear the alarm clock                 Review of Systems   Constitutional, HEENT, cardiovascular, pulmonary, gi and gu systems are negative, except as otherwise noted.      Objective    /78   Pulse 71   Temp 97.6  F (36.4  C)   Resp 20   Ht 1.628 m (5' 4.1\")   Wt 100.7 kg (222 lb)   SpO2 96%   BMI 37.99 kg/m    Body mass index is 37.99 kg/m .  Physical Exam   GENERAL: healthy, alert and no distress  EYES: Eyes grossly normal to inspection, PERRL and conjunctivae and sclerae normal  HENT: ear canals and TM's normal, nose and mouth without ulcers or lesions  NECK: no adenopathy, no asymmetry, masses, or scars and thyroid normal to palpation  RESP: lungs clear to auscultation - no rales, rhonchi or wheezes  CV: regular rate and rhythm, normal S1 S2, no S3 or S4, no murmur, click or rub, no peripheral edema and peripheral pulses strong  ABDOMEN: soft, nontender, no hepatosplenomegaly, no masses and bowel sounds normal  MS: no gross musculoskeletal defects noted, no edema  SKIN: no suspicious lesions or rashes  NEURO: Normal strength and tone, mentation intact and speech normal  BACK: no CVA tenderness, no paralumbar tenderness  PSYCH: mentation appears normal, affect normal/bright  LYMPH: no cervical, supraclavicular, axillary, or inguinal adenopathy    Results for orders placed or performed in visit on 04/10/23   Comprehensive metabolic panel (BMP + Alb, Alk Phos, ALT, AST, Total. Bili, TP)     Status: Abnormal   Result Value Ref Range    " Sodium 136 133 - 144 mmol/L    Potassium 4.0 3.4 - 5.3 mmol/L    Chloride 105 94 - 109 mmol/L    Carbon Dioxide (CO2) 28 20 - 32 mmol/L    Anion Gap 3 3 - 14 mmol/L    Urea Nitrogen 17 7 - 30 mg/dL    Creatinine 0.91 0.66 - 1.25 mg/dL    Calcium 9.0 8.5 - 10.1 mg/dL    Glucose 122 (H) 70 - 99 mg/dL    Alkaline Phosphatase 95 40 - 150 U/L    AST 21 0 - 45 U/L    ALT 32 0 - 70 U/L    Protein Total 6.6 (L) 6.8 - 8.8 g/dL    Albumin 3.5 3.4 - 5.0 g/dL    Bilirubin Total 0.7 0.2 - 1.3 mg/dL    GFR Estimate 90 >60 mL/min/1.73m2   Lipid panel reflex to direct LDL Fasting     Status: None   Result Value Ref Range    Cholesterol 162 <200 mg/dL    Triglycerides 107 <150 mg/dL    Direct Measure HDL 60 >=40 mg/dL    LDL Cholesterol Calculated 81 <=100 mg/dL    Non HDL Cholesterol 102 <130 mg/dL    Patient Fasting > 8hrs? Unknown     Narrative    Cholesterol  Desirable:  <200 mg/dL    Triglycerides  Normal:  Less than 150 mg/dL  Borderline High:  150-199 mg/dL  High:  200-499 mg/dL  Very High:  Greater than or equal to 500 mg/dL    Direct Measure HDL  Female:  Greater than or equal to 50 mg/dL   Male:  Greater than or equal to 40 mg/dL    LDL Cholesterol  Desirable:  <100mg/dL  Above Desirable:  100-129 mg/dL   Borderline High:  130-159 mg/dL   High:  160-189 mg/dL   Very High:  >= 190 mg/dL    Non HDL Cholesterol  Desirable:  130 mg/dL  Above Desirable:  130-159 mg/dL  Borderline High:  160-189 mg/dL  High:  190-219 mg/dL  Very High:  Greater than or equal to 220 mg/dL

## 2023-04-14 DIAGNOSIS — I10 HYPERTENSION GOAL BP (BLOOD PRESSURE) < 140/90: ICD-10-CM

## 2023-04-14 DIAGNOSIS — E78.5 HYPERLIPIDEMIA LDL GOAL <130: ICD-10-CM

## 2023-04-14 NOTE — TELEPHONE ENCOUNTER
Pending Prescriptions:                       Disp   Refills    lisinopril (ZESTRIL) 40 MG tablet         90 tab*4            Sig: Take 1 tablet (40 mg) by mouth daily    hydrochlorothiazide (HYDRODIURIL) 12.5 MG*90 tab*4            Sig: Take 1 tablet (12.5 mg) by mouth daily    simvastatin (ZOCOR) 20 MG tablet          90 tab*4            Sig: Take 1 tablet (20 mg) by mouth At Bedtime

## 2023-04-17 RX ORDER — SIMVASTATIN 20 MG
20 TABLET ORAL AT BEDTIME
Qty: 90 TABLET | Refills: 2 | Status: SHIPPED | OUTPATIENT
Start: 2023-04-17 | End: 2023-12-08

## 2023-04-17 RX ORDER — HYDROCHLOROTHIAZIDE 12.5 MG/1
12.5 TABLET ORAL DAILY
Qty: 90 TABLET | Refills: 2 | Status: SHIPPED | OUTPATIENT
Start: 2023-04-17 | End: 2023-11-01

## 2023-04-17 RX ORDER — LISINOPRIL 40 MG/1
40 TABLET ORAL DAILY
Qty: 90 TABLET | Refills: 2 | Status: SHIPPED | OUTPATIENT
Start: 2023-04-17 | End: 2023-10-02

## 2023-04-19 ENCOUNTER — ANCILLARY PROCEDURE (OUTPATIENT)
Dept: ULTRASOUND IMAGING | Facility: CLINIC | Age: 73
End: 2023-04-19
Attending: INTERNAL MEDICINE
Payer: COMMERCIAL

## 2023-04-19 DIAGNOSIS — N28.1 RENAL CYST: ICD-10-CM

## 2023-04-19 PROCEDURE — 76770 US EXAM ABDO BACK WALL COMP: CPT | Mod: TC | Performed by: RADIOLOGY

## 2023-04-23 ENCOUNTER — HEALTH MAINTENANCE LETTER (OUTPATIENT)
Age: 73
End: 2023-04-23

## 2023-08-01 ENCOUNTER — OFFICE VISIT (OUTPATIENT)
Dept: OTOLARYNGOLOGY | Facility: CLINIC | Age: 73
End: 2023-08-01
Attending: INTERNAL MEDICINE
Payer: COMMERCIAL

## 2023-08-01 ENCOUNTER — OFFICE VISIT (OUTPATIENT)
Dept: AUDIOLOGY | Facility: CLINIC | Age: 73
End: 2023-08-01
Attending: INTERNAL MEDICINE
Payer: COMMERCIAL

## 2023-08-01 VITALS — OXYGEN SATURATION: 96 % | SYSTOLIC BLOOD PRESSURE: 146 MMHG | DIASTOLIC BLOOD PRESSURE: 82 MMHG | HEART RATE: 63 BPM

## 2023-08-01 DIAGNOSIS — H90.3 ASYMMETRICAL SENSORINEURAL HEARING LOSS: Primary | ICD-10-CM

## 2023-08-01 DIAGNOSIS — H90.3 SNHL (SENSORY-NEURAL HEARING LOSS), ASYMMETRICAL: ICD-10-CM

## 2023-08-01 DIAGNOSIS — H91.92 DECREASED HEARING, LEFT: ICD-10-CM

## 2023-08-01 DIAGNOSIS — H90.3 SNHL (SENSORY-NEURAL HEARING LOSS), ASYMMETRICAL: Primary | ICD-10-CM

## 2023-08-01 DIAGNOSIS — H81.02 MENIERE'S DISEASE, LEFT: ICD-10-CM

## 2023-08-01 DIAGNOSIS — R42 DIZZINESS: ICD-10-CM

## 2023-08-01 PROCEDURE — 92557 COMPREHENSIVE HEARING TEST: CPT | Performed by: AUDIOLOGIST

## 2023-08-01 PROCEDURE — 99204 OFFICE O/P NEW MOD 45 MIN: CPT | Performed by: OTOLARYNGOLOGY

## 2023-08-01 PROCEDURE — 99207 PR NO CHARGE LOS: CPT | Performed by: AUDIOLOGIST

## 2023-08-01 PROCEDURE — 92550 TYMPANOMETRY & REFLEX THRESH: CPT | Mod: 52 | Performed by: AUDIOLOGIST

## 2023-08-01 RX ORDER — MECLIZINE HYDROCHLORIDE 25 MG/1
25 TABLET ORAL 3 TIMES DAILY PRN
Qty: 30 TABLET | Refills: 3 | Status: SHIPPED | OUTPATIENT
Start: 2023-08-01

## 2023-08-01 NOTE — LETTER
8/1/2023         RE: Lico Osullivan  [address] [facility] [address]        Dear Colleague,    Thank you for referring your patient, Lico Oslulivan, to the [facility]. Please see a copy of my visit note below.    I am seeing this patient in consultation for decreased hearing at the request of the provider Demetrius Olivera.    Chief Complaint - Hearing loss, vertigo    History of Present Illness - Lico Osullivan is a 73 year old male who presents to me today with hearing loss in both ears, but worse in the left.  It has been present and noticeable for approximately 1 year.  It was not sudden in onset. He tried removing wax, didn't help. He thinks a slow progression over time. Never worn hearing aids. There is no history of chronic ear disease or ear surgery.  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 No family history of hearing loss at a young age. He went to the ER 11/2022 for vertigo. MRI brain was done then. No cause was identified. He occasionally gets vertigo (room spinning). If he sits it may last for 1 hour. He will take meclizine, which helps. He has left tinnitus. Left ear feels plugged.     Tests personally reviewed today for this visit:   1.) audiogram was performed today as part of the evaluation and personally reviewed. The audiogram showed a significant asymmetric moderately severe sensorineural hearing loss left ear and down-sloping high frequency sensorineural hearing loss right ear.    2.) tympanograms were performed today and were normal Type A curves, with normal canal volume and middle ear pressure.    3.) MRI brain 11/8/22 report reviewed, no mention of retrocochlear pathology.    Past Medical History -   Patient Active Problem List   Diagnosis     Prostate CA (H)     Colon polyp     HYPERLIPIDEMIA LDL GOAL <130     Advanced directives, counseling/discussion      Hypertension goal BP (blood pressure) < 140/90     Vitreous condensations, os     Pseuophakia, Yag Caps, ou     Severe obesity (BMI 35.0-35.9 with comorbidity) (H)     Hx of retinal hemorrhage of left eye, remotely     Epiretinal membrane, mild, of right eye     Retinal hemorrhage of right eye     Glaucoma suspect of both eyes       Current Medications -   Current Outpatient Medications:      Acetaminophen (TYLENOL PO), As needed, Disp: , Rfl:      aspirin 325 MG EC tablet, Take  by mouth daily. 1 tablet daily , Disp: , Rfl:      hydrochlorothiazide (HYDRODIURIL) 12.5 MG tablet, Take 1 tablet (12.5 mg) by mouth daily, Disp: 90 tablet, Rfl: 2     lisinopril (ZESTRIL) 40 MG tablet, Take 1 tablet (40 mg) by mouth daily, Disp: 90 tablet, Rfl: 2     meclizine (ANTIVERT) 25 MG tablet, Take 25 mg by mouth 3 times daily as needed, Disp: , Rfl:      simvastatin (ZOCOR) 20 MG tablet, Take 1 tablet (20 mg) by mouth At Bedtime, Disp: 90 tablet, Rfl: 2    Allergies - No Known Allergies    Social History -   Social History     Socioeconomic History     Marital status: Single   Tobacco Use     Smoking status: Former     Packs/day: 1.00     Years: 0.00     Pack years: 0.00     Types: Cigarettes     Quit date: 9/10/1994     Years since quittin.9     Passive exposure: Past     Smokeless tobacco: Never     Tobacco comments:     smoke free household.   Vaping Use     Vaping Use: Never used   Substance and Sexual Activity     Alcohol use: Yes     Alcohol/week: 6.0 standard drinks of alcohol     Comment: occ.     Drug use: No     Comment: marijuana use in the past     Sexual activity: Never   Other Topics Concern     Parent/sibling w/ CABG, MI or angioplasty before 65F 55M? No       Family History -   Family History   Problem Relation Age of Onset     Cataracts Mother      C.A.D. Father      Psychotic Disorder Father         schizophrenia     Prostate Cancer Father         full surgery     Diabetes Father      Prostate Cancer  Maternal Grandmother      Prostate Cancer Maternal Grandfather         partial surgery     Prostate Cancer Paternal Grandfather      Cerebrovascular Disease Paternal Grandfather      Hypertension Sister      Cataracts Sister      Psychotic Disorder Sister         schizophrenia     Asthma Nephew         childhood case     Asthma Other         nephew     Asthma Other      Glaucoma No family hx of      Macular Degeneration No family hx of        Review of Systems - As per HPI and PMHx, otherwise 7 system review is negative.    Physical Exam  General - The patient is in no distress.  Alert and oriented to person and place, answers questions and cooperates with examination appropriately.   Voice and Breathing - The patient was breathing comfortably without the use of accessory muscles. There was no wheezing, stridor, or stertor.  The patients voice was clear and strong.  Ears - The auricles are normal. The tympanic membranes are normal in appearance, bony landmarks are intact.  No retraction, perforation, or masses.  No fluid or purulence was seen in the external canal or the middle ear. No evidence of infection of the middle ear or external canal, cerumen was normal in appearance.  Neck - Soft, non-tender. Palpation of the occipital, submental, submandibular, internal jugular chain, and supraclavicular nodes did not demonstrate any abnormal lymph nodes or masses. Parotid glands had no masses. Palpation of the thyroid was soft and smooth, with no nodules or goiter appreciated.  The trachea was mobile and midline.  Neurological - Cranial nerves 2 through 12 were grossly intact. House-Brackmann grade 1 out of 6 bilaterally. Finger-nose-finger a little sluggish left hand.       Assessment and Plan -     ICD-10-CM    1. SNHL (sensory-neural hearing loss), asymmetrical  H90.3 Adult Audiology  Referral      2. Meniere's disease, left  H81.02 meclizine (ANTIVERT) 25 MG tablet          Lico Osullivan is a 73 year old  male who presents to me today with asymmetric sensorineural hearing loss. The asymmetry was not a sudden loss. Meniere's disease is possible given the asymmetric sensorineural hearing loss, recurrent vertigo, tinnitus and fullness. MRI brain was normal. I recommend considering a CROS aid or right hearing aid. I gave him a handout on Meniere's. We discussed trying diet and lifestyle changes first. He can use meclizine as needed. Recheck hearing in 4-6 months, sooner should vertigo worsen. May consider dexamethasone injection.     Sanju Milner MD  Otolaryngology  [facility]          Lico Osullivan was evaluated in a specialty clinic today at which time his blood pressure was noted to be elevated.  He  has been advised to follow up with his primary provider or with a nurse only visit. We are also routing this message to his primary provider as an FYI.    NGOZI Thurman RN 8/1/2023 11:06 AM      Again, thank you for allowing me to participate in the care of your patient.        Sincerely,        Sanju Milner MD

## 2023-08-01 NOTE — PROGRESS NOTES
Lico Osullivan was evaluated in a specialty clinic today at which time his blood pressure was noted to be elevated.  He  has been advised to follow up with his primary provider or with a nurse only visit. We are also routing this message to his primary provider as an FYI.    NGOZI Thurman RN 8/1/2023 11:06 AM

## 2023-08-01 NOTE — PROGRESS NOTES
AUDIOLOGY REPORT:    Patient was referred from ENT by Dr. Milner for audiology evaluation. The patient reports that he has had some episodes of vertigo in the past but had a bad episode on Election Day in November last year. He reports that he was carrying things in a parking lot and ended up falling. He reports that he was seen at the emergency department and had imaging. He reports that his primary care provider adjusted his blood pressure medication and he was also prescribed medication for vertigo. The patient notes increased symptoms with nasal congestion and that drinking water can help. He describes his symptoms as spinning for a few minutes. The patient reports known hearing loss in the left ear as well as tinnitus in the left ear. There is no known cause for the asymmetry. He reports that his hearing is getting worse. The patient reports a history of occupational noise exposure and a family history of hearing loss with age. He reports that he does not have ear pain, ear pressure, or a history of ear problems or ear surgery.    Testing:    Otoscopy:   Otoscopic exam indicates ears are clear of cerumen bilaterally     Tympanograms:    RIGHT: normal eardrum mobility     LEFT:   normal eardrum mobility    Reflexes (reported by stimulus ear):  RIGHT: Ipsilateral could not seal  RIGHT: Contralateral is present at normal levels  LEFT:   Ipsilateral is absent at frequencies tested  LEFT:   Contralateral could not seal    Thresholds:   Pure Tone Thresholds assessed using conventional audiometry with good reliability from 250-8000 Hz bilaterally using insert earphones and circumaural headphones     RIGHT:   mild to moderately severe  sensorineural hearing loss with normal hearing sensitivity at 250 and 1000 Hz    LEFT:     severe rising to moderately severe  sensorineural hearing loss    Speech Reception Threshold:    RIGHT: 25 dB HL    LEFT:   no response at 100 dB HL; speech detection threshold at 60 dB HL  Results  are in agreement with pure tone average for the right ear and with the best thresholds for the left ear.     Word Recognition Score:     RIGHT: 92% at 70 dB HL using NU-6 recorded word list.    LEFT:   16% at 100 dB HL using NU-6 recorded word list.    Discussed results with the patient.     Patient was returned to ENT for follow up.     Griffin Pickett, CCC-A  Licensed Audiologist #[id number]  8/1/2023

## 2023-08-01 NOTE — PROGRESS NOTES
I am seeing this patient in consultation for decreased hearing at the request of the provider Demetrius Olivera.    Chief Complaint - Hearing loss, vertigo    History of Present Illness - Lico Osullivan is a 73 year old male who presents to me today with hearing loss in both ears, but worse in the left.  It has been present and noticeable for approximately 1 year.  It was not sudden in onset. He tried removing wax, didn't help. He thinks a slow progression over time. Never worn hearing aids. There is no history of chronic ear disease or ear surgery.  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 No family history of hearing loss at a young age. He went to the ER 11/2022 for vertigo. MRI brain was done then. No cause was identified. He occasionally gets vertigo (room spinning). If he sits it may last for 1 hour. He will take meclizine, which helps. He has left tinnitus. Left ear feels plugged.     Tests personally reviewed today for this visit:   1.) audiogram was performed today as part of the evaluation and personally reviewed. The audiogram showed a significant asymmetric moderately severe sensorineural hearing loss left ear and down-sloping high frequency sensorineural hearing loss right ear.    2.) tympanograms were performed today and were normal Type A curves, with normal canal volume and middle ear pressure.    3.) MRI brain 11/8/22 report reviewed, no mention of retrocochlear pathology.    Past Medical History -   Patient Active Problem List   Diagnosis    Prostate CA (H)    Colon polyp    HYPERLIPIDEMIA LDL GOAL <130    Advanced directives, counseling/discussion    Hypertension goal BP (blood pressure) < 140/90    Vitreous condensations, os    Pseuophakia, Yag Caps, ou    Severe obesity (BMI 35.0-35.9 with comorbidity) (H)    Hx of retinal hemorrhage of left eye, remotely    Epiretinal membrane, mild, of right eye    Retinal  hemorrhage of right eye    Glaucoma suspect of both eyes       Current Medications -   Current Outpatient Medications:     Acetaminophen (TYLENOL PO), As needed, Disp: , Rfl:     aspirin 325 MG EC tablet, Take  by mouth daily. 1 tablet daily , Disp: , Rfl:     hydrochlorothiazide (HYDRODIURIL) 12.5 MG tablet, Take 1 tablet (12.5 mg) by mouth daily, Disp: 90 tablet, Rfl: 2    lisinopril (ZESTRIL) 40 MG tablet, Take 1 tablet (40 mg) by mouth daily, Disp: 90 tablet, Rfl: 2    meclizine (ANTIVERT) 25 MG tablet, Take 25 mg by mouth 3 times daily as needed, Disp: , Rfl:     simvastatin (ZOCOR) 20 MG tablet, Take 1 tablet (20 mg) by mouth At Bedtime, Disp: 90 tablet, Rfl: 2    Allergies - No Known Allergies    Social History -   Social History     Socioeconomic History    Marital status: Single   Tobacco Use    Smoking status: Former     Packs/day: 1.00     Years: 0.00     Pack years: 0.00     Types: Cigarettes     Quit date: 9/10/1994     Years since quittin.9     Passive exposure: Past    Smokeless tobacco: Never    Tobacco comments:     smoke free household.   Vaping Use    Vaping Use: Never used   Substance and Sexual Activity    Alcohol use: Yes     Alcohol/week: 6.0 standard drinks of alcohol     Comment: occ.    Drug use: No     Comment: marijuana use in the past    Sexual activity: Never   Other Topics Concern    Parent/sibling w/ CABG, MI or angioplasty before 65F 55M? No       Family History -   Family History   Problem Relation Age of Onset    Cataracts Mother     C.A.D. Father     Psychotic Disorder Father         schizophrenia    Prostate Cancer Father         full surgery    Diabetes Father     Prostate Cancer Maternal Grandmother     Prostate Cancer Maternal Grandfather         partial surgery    Prostate Cancer Paternal Grandfather     Cerebrovascular Disease Paternal Grandfather     Hypertension Sister     Cataracts Sister     Psychotic Disorder Sister         schizophrenia    Asthma Nephew          childhood case    Asthma Other         nephew    Asthma Other     Glaucoma No family hx of     Macular Degeneration No family hx of        Review of Systems - As per HPI and PMHx, otherwise 7 system review is negative.    Physical Exam  General - The patient is in no distress.  Alert and oriented to person and place, answers questions and cooperates with examination appropriately.   Voice and Breathing - The patient was breathing comfortably without the use of accessory muscles. There was no wheezing, stridor, or stertor.  The patients voice was clear and strong.  Ears - The auricles are normal. The tympanic membranes are normal in appearance, bony landmarks are intact.  No retraction, perforation, or masses.  No fluid or purulence was seen in the external canal or the middle ear. No evidence of infection of the middle ear or external canal, cerumen was normal in appearance.  Neck - Soft, non-tender. Palpation of the occipital, submental, submandibular, internal jugular chain, and supraclavicular nodes did not demonstrate any abnormal lymph nodes or masses. Parotid glands had no masses. Palpation of the thyroid was soft and smooth, with no nodules or goiter appreciated.  The trachea was mobile and midline.  Neurological - Cranial nerves 2 through 12 were grossly intact. House-Brackmann grade 1 out of 6 bilaterally. Finger-nose-finger a little sluggish left hand.       Assessment and Plan -     ICD-10-CM    1. SNHL (sensory-neural hearing loss), asymmetrical  H90.3 Adult Audiology  Referral      2. Meniere's disease, left  H81.02 meclizine (ANTIVERT) 25 MG tablet          Lico Osullivan is a 73 year old male who presents to me today with asymmetric sensorineural hearing loss. The asymmetry was not a sudden loss. Meniere's disease is possible given the asymmetric sensorineural hearing loss, recurrent vertigo, tinnitus and fullness. MRI brain was normal. I recommend considering a CROS aid or right hearing aid.  I gave him a handout on Meniere's. We discussed trying diet and lifestyle changes first. He can use meclizine as needed. Recheck hearing in 4-6 months, sooner should vertigo worsen. May consider dexamethasone injection.     Sanju Milner MD  Otolaryngology  [facility]

## 2023-09-08 ENCOUNTER — MYC MEDICAL ADVICE (OUTPATIENT)
Dept: FAMILY MEDICINE | Facility: CLINIC | Age: 73
End: 2023-09-08
Payer: COMMERCIAL

## 2023-09-08 NOTE — TELEPHONE ENCOUNTER
Patient Quality Outreach    Patient is due for the following:   Hypertension -  Hypertension follow-up visit      Topic Date Due    Zoster (Shingles) Vaccine (2 of 3) 11/20/2016    COVID-19 Vaccine (3 - Pfizer series) 02/01/2022    Flu Vaccine (1) Never done       Next Steps:   Patient has upcoming appointment, these items will be addressed at that time.    Type of outreach:    Chart review performed, no outreach needed.      Questions for provider review:    None           Soni Velarde CMA  Chart routed to Care Team.

## 2023-09-24 ENCOUNTER — HEALTH MAINTENANCE LETTER (OUTPATIENT)
Age: 73
End: 2023-09-24

## 2023-10-01 DIAGNOSIS — I10 HYPERTENSION GOAL BP (BLOOD PRESSURE) < 140/90: ICD-10-CM

## 2023-10-02 RX ORDER — LISINOPRIL 40 MG/1
40 TABLET ORAL DAILY
Qty: 100 TABLET | Refills: 0 | Status: SHIPPED | OUTPATIENT
Start: 2023-10-02 | End: 2024-01-08

## 2023-10-29 ASSESSMENT — ENCOUNTER SYMPTOMS
CONSTIPATION: 0
DIZZINESS: 0
WEAKNESS: 1
JOINT SWELLING: 0
CHILLS: 0
ARTHRALGIAS: 1
PARESTHESIAS: 0
SORE THROAT: 0
DYSURIA: 0
DIARRHEA: 0
PALPITATIONS: 0
FEVER: 0
HEARTBURN: 0
COUGH: 0
EYE PAIN: 0
HEADACHES: 0
NERVOUS/ANXIOUS: 0
SHORTNESS OF BREATH: 1
HEMATURIA: 0
MYALGIAS: 1
NAUSEA: 0
ABDOMINAL PAIN: 0
FREQUENCY: 0
HEMATOCHEZIA: 0

## 2023-10-29 ASSESSMENT — ACTIVITIES OF DAILY LIVING (ADL): CURRENT_FUNCTION: NO ASSISTANCE NEEDED

## 2023-11-01 ENCOUNTER — OFFICE VISIT (OUTPATIENT)
Dept: FAMILY MEDICINE | Facility: CLINIC | Age: 73
End: 2023-11-01
Payer: COMMERCIAL

## 2023-11-01 VITALS
OXYGEN SATURATION: 96 % | BODY MASS INDEX: 38.76 KG/M2 | WEIGHT: 227 LBS | RESPIRATION RATE: 18 BRPM | HEART RATE: 68 BPM | SYSTOLIC BLOOD PRESSURE: 138 MMHG | HEIGHT: 64 IN | DIASTOLIC BLOOD PRESSURE: 82 MMHG | TEMPERATURE: 97.4 F

## 2023-11-01 DIAGNOSIS — H81.02 MENIERE'S DISEASE, LEFT: ICD-10-CM

## 2023-11-01 DIAGNOSIS — C61 PROSTATE CA (H): ICD-10-CM

## 2023-11-01 DIAGNOSIS — I10 HYPERTENSION GOAL BP (BLOOD PRESSURE) < 140/90: ICD-10-CM

## 2023-11-01 DIAGNOSIS — Z29.11 NEED FOR VACCINATION AGAINST RESPIRATORY SYNCYTIAL VIRUS: ICD-10-CM

## 2023-11-01 DIAGNOSIS — R53.83 OTHER FATIGUE: ICD-10-CM

## 2023-11-01 DIAGNOSIS — E53.8 VITAMIN B12 DEFICIENCY (NON ANEMIC): ICD-10-CM

## 2023-11-01 DIAGNOSIS — R07.89 ATYPICAL CHEST PAIN: Primary | ICD-10-CM

## 2023-11-01 DIAGNOSIS — Z23 NEED FOR SHINGLES VACCINE: ICD-10-CM

## 2023-11-01 LAB
ALBUMIN SERPL BCG-MCNC: 3.9 G/DL (ref 3.5–5.2)
ALP SERPL-CCNC: 94 U/L (ref 40–129)
ALT SERPL W P-5'-P-CCNC: 20 U/L (ref 0–70)
ANION GAP SERPL CALCULATED.3IONS-SCNC: 9 MMOL/L (ref 7–15)
AST SERPL W P-5'-P-CCNC: 27 U/L (ref 0–45)
BASOPHILS # BLD AUTO: 0 10E3/UL (ref 0–0.2)
BASOPHILS NFR BLD AUTO: 0 %
BILIRUB SERPL-MCNC: 0.6 MG/DL
BUN SERPL-MCNC: 17.1 MG/DL (ref 8–23)
CALCIUM SERPL-MCNC: 9.5 MG/DL (ref 8.8–10.2)
CHLORIDE SERPL-SCNC: 102 MMOL/L (ref 98–107)
CREAT SERPL-MCNC: 0.96 MG/DL (ref 0.67–1.17)
DEPRECATED HCO3 PLAS-SCNC: 27 MMOL/L (ref 22–29)
EGFRCR SERPLBLD CKD-EPI 2021: 84 ML/MIN/1.73M2
EOSINOPHIL # BLD AUTO: 0.1 10E3/UL (ref 0–0.7)
EOSINOPHIL NFR BLD AUTO: 2 %
ERYTHROCYTE [DISTWIDTH] IN BLOOD BY AUTOMATED COUNT: 13.2 % (ref 10–15)
GLUCOSE SERPL-MCNC: 124 MG/DL (ref 70–99)
HCT VFR BLD AUTO: 48.7 % (ref 40–53)
HGB BLD-MCNC: 16.4 G/DL (ref 13.3–17.7)
IMM GRANULOCYTES # BLD: 0 10E3/UL
IMM GRANULOCYTES NFR BLD: 0 %
LYMPHOCYTES # BLD AUTO: 1.3 10E3/UL (ref 0.8–5.3)
LYMPHOCYTES NFR BLD AUTO: 17 %
MCH RBC QN AUTO: 31.9 PG (ref 26.5–33)
MCHC RBC AUTO-ENTMCNC: 33.7 G/DL (ref 31.5–36.5)
MCV RBC AUTO: 95 FL (ref 78–100)
MONOCYTES # BLD AUTO: 0.7 10E3/UL (ref 0–1.3)
MONOCYTES NFR BLD AUTO: 9 %
NEUTROPHILS # BLD AUTO: 5.8 10E3/UL (ref 1.6–8.3)
NEUTROPHILS NFR BLD AUTO: 72 %
PLATELET # BLD AUTO: 229 10E3/UL (ref 150–450)
POTASSIUM SERPL-SCNC: 4.8 MMOL/L (ref 3.4–5.3)
PROT SERPL-MCNC: 6.7 G/DL (ref 6.4–8.3)
PSA SERPL DL<=0.01 NG/ML-MCNC: 0.04 NG/ML (ref 0–6.5)
RBC # BLD AUTO: 5.14 10E6/UL (ref 4.4–5.9)
SODIUM SERPL-SCNC: 138 MMOL/L (ref 135–145)
TSH SERPL DL<=0.005 MIU/L-ACNC: 2.34 UIU/ML (ref 0.3–4.2)
VIT B12 SERPL-MCNC: 420 PG/ML (ref 232–1245)
WBC # BLD AUTO: 8 10E3/UL (ref 4–11)

## 2023-11-01 PROCEDURE — 80053 COMPREHEN METABOLIC PANEL: CPT | Performed by: INTERNAL MEDICINE

## 2023-11-01 PROCEDURE — 36415 COLL VENOUS BLD VENIPUNCTURE: CPT | Performed by: INTERNAL MEDICINE

## 2023-11-01 PROCEDURE — 82607 VITAMIN B-12: CPT | Performed by: INTERNAL MEDICINE

## 2023-11-01 PROCEDURE — 85025 COMPLETE CBC W/AUTO DIFF WBC: CPT | Performed by: INTERNAL MEDICINE

## 2023-11-01 PROCEDURE — 84443 ASSAY THYROID STIM HORMONE: CPT | Performed by: INTERNAL MEDICINE

## 2023-11-01 PROCEDURE — G0439 PPPS, SUBSEQ VISIT: HCPCS | Performed by: INTERNAL MEDICINE

## 2023-11-01 PROCEDURE — 84153 ASSAY OF PSA TOTAL: CPT | Performed by: INTERNAL MEDICINE

## 2023-11-01 RX ORDER — RESPIRATORY SYNCYTIAL VIRUS VACCINE 120MCG/0.5
0.5 KIT INTRAMUSCULAR ONCE
Qty: 1 EACH | Refills: 0 | Status: CANCELLED | OUTPATIENT
Start: 2023-11-01 | End: 2023-11-01

## 2023-11-01 RX ORDER — TRIAMTERENE/HYDROCHLOROTHIAZID 37.5-25 MG
1 TABLET ORAL DAILY
Qty: 90 TABLET | Refills: 4 | Status: SHIPPED | OUTPATIENT
Start: 2023-11-01 | End: 2024-09-23

## 2023-11-01 ASSESSMENT — ENCOUNTER SYMPTOMS
MYALGIAS: 1
HEARTBURN: 0
SHORTNESS OF BREATH: 1
SORE THROAT: 0
EYE PAIN: 0
FREQUENCY: 0
CONSTIPATION: 0
JOINT SWELLING: 0
NERVOUS/ANXIOUS: 0
DIZZINESS: 0
ARTHRALGIAS: 1
HEMATURIA: 0
PALPITATIONS: 0
CHILLS: 0
WEAKNESS: 1
FEVER: 0
HEMATOCHEZIA: 0
NAUSEA: 0
ABDOMINAL PAIN: 0
DIARRHEA: 0
PARESTHESIAS: 0
HEADACHES: 0
COUGH: 0
DYSURIA: 0

## 2023-11-01 ASSESSMENT — ACTIVITIES OF DAILY LIVING (ADL): CURRENT_FUNCTION: NO ASSISTANCE NEEDED

## 2023-11-01 NOTE — PROGRESS NOTES
"SUBJECTIVE:   Lico is a 73 year old who presents for Preventive Visit.      11/1/2023    10:24 AM   Additional Questions   Roomed by Suzi       Are you in the first 12 months of your Medicare coverage?  No    Healthy Habits:     In general, how would you rate your overall health?  Fair    Frequency of exercise:  None    Do you usually eat at least 4 servings of fruit and vegetables a day, include whole grains    & fiber and avoid regularly eating high fat or \"junk\" foods?  No    Taking medications regularly:  Yes    Barriers to taking medications:  None    Medication side effects:  None    Ability to successfully perform activities of daily living:  No assistance needed    Home Safety:  No safety concerns identified    Hearing Impairment:  Need to ask people to speak up or repeat themselves and difficulty understanding soft or whispered speech    In the past 6 months, have you been bothered by leaking of urine?  No    In general, how would you rate your overall mental or emotional health?  Good    Additional concerns today:  No    Saw Dr. Milner  lack of hearing in the left ear tinnitus   Menier's disease .    Used a few days . Gets sleepy if using the meclizine   One hour of lawn work .   Sometimes shortness of breath   Stress test 30 years ago   Was jogging at that time on the treadmill     Today's PHQ-2 Score:       11/1/2023    10:17 AM   PHQ-2 ( 1999 [employer])   Q1: Little interest or pleasure in doing things 0   Q2: Feeling down, depressed or hopeless 0   PHQ-2 Score 0   Q1: Little interest or pleasure in doing things Not at all   Q2: Feeling down, depressed or hopeless Not at all   PHQ-2 Score 0       Have you ever done Advance Care Planning? (For example, a Health Directive, POLST, or a discussion with a medical provider or your loved ones about your wishes): No, advance care planning information given to patient to review.  Patient plans to discuss their wishes with loved ones or provider.         Fall " risk  Fallen 2 or more times in the past year?: No  Any fall with injury in the past year?: Yes    Cognitive Screening   1) Repeat 3 items (Leader, Season, Table)    2) Clock draw: NORMAL  3) 3 item recall: Recalls 3 objects  Results: 3 items recalled: COGNITIVE IMPAIRMENT LESS LIKELY    Mini-CogTM Copyright S Freda. Licensed by the author for use in [facility]; reprinted with permission (soclyde@[facility]). All rights reserved.      Do you have sleep apnea, excessive snoring or daytime drowsiness? : no    Reviewed and updated as needed this visit by clinical staff   Tobacco  Allergies  Meds  Problems             Reviewed and updated as needed this visit by Provider                 Social History     Tobacco Use     Smoking status: Former     Packs/day: 1.00     Years: 0.00     Additional pack years: 0.00     Total pack years: 0.00     Types: Cigarettes     Quit date: 9/10/1994     Years since quittin.1     Passive exposure: Past     Smokeless tobacco: Never     Tobacco comments:     smoke free household.   Substance Use Topics     Alcohol use: Yes     Alcohol/week: 6.0 standard drinks of alcohol     Comment: occ.             10/29/2023     7:00 PM   Alcohol Use   Prescreen: >3 drinks/day or >7 drinks/week? No     Do you have a current opioid prescription? No  Do you use any other controlled substances or medications that are not prescribed by a provider? None              Current providers sharing in care for this patient include:   Patient Care Team:  Demetrius Lassiter MD as PCP - General (Internal Medicine - Pediatrics)  Glynn Neal MD as Assigned Surgical Provider  Demetrius Lassiter MD as Assigned PCP    The following health maintenance items are reviewed in Epic and correct as of today:  Health Maintenance   Topic Date Due     RSV VACCINE 60+ (1 - 1-dose 60+ series) Never done     ZOSTER IMMUNIZATION (2 of 3) 2016     MEDICARE ANNUAL WELLNESS VISIT  2023     INFLUENZA VACCINE  (1) Never done     COVID-19 Vaccine (3 - 2023-24 season) 09/01/2023     EYE EXAM  11/16/2023     ANNUAL REVIEW OF HM ORDERS  04/10/2024     FALL RISK ASSESSMENT  11/01/2024     DTAP/TDAP/TD IMMUNIZATION (2 - Td or Tdap) 09/26/2026     ADVANCE CARE PLANNING  07/25/2027     LIPID  04/10/2028     COLORECTAL CANCER SCREENING  01/04/2032     HEPATITIS C SCREENING  Completed     PHQ-2 (once per calendar year)  Completed     Pneumococcal Vaccine: 65+ Years  Completed     AORTIC ANEURYSM SCREENING (SYSTEM ASSIGNED)  Completed     IPV IMMUNIZATION  Aged Out     HPV IMMUNIZATION  Aged Out     MENINGITIS IMMUNIZATION  Aged Out     RSV MONOCLONAL ANTIBODY  Aged Out     Lab work is in process  Labs reviewed in EPIC  BP Readings from Last 3 Encounters:   11/01/23 138/82   08/01/23 (!) 146/82   04/10/23 124/78    Wt Readings from Last 3 Encounters:   11/01/23 103 kg (227 lb)   04/10/23 100.7 kg (222 lb)   11/14/22 102.1 kg (225 lb)                  Patient Active Problem List   Diagnosis     Prostate CA (H)     Colon polyp     HYPERLIPIDEMIA LDL GOAL <130     Advanced directives, counseling/discussion     Hypertension goal BP (blood pressure) < 140/90     Vitreous condensations, os     Pseuophakia, Yag Caps, ou     Severe obesity (BMI 35.0-35.9 with comorbidity) (H)     Hx of retinal hemorrhage of left eye, remotely     Epiretinal membrane, mild, of right eye     Retinal hemorrhage of right eye     Glaucoma suspect of both eyes     Past Surgical History:   Procedure Laterality Date     CATARACT IOL, RT/LT       CL AFF SURGICAL PATHOLOGY      2006     COLONOSCOPY  8/9/2011    Procedure:COMBINED COLONOSCOPY, REMOVE TUMOR/POLYP/LESION BY SNARE; Surgeon:ALBARO LANGFORD; Location:[facility] OR     LASER YAG CAPSULOTOMY  10/2015; 11/2016    left eye; right eye     PHACOEMULSIFICATION WITH STANDARD INTRAOCULAR LENS IMPLANT  10/2012; 6/2014    left eye; right eye     SUPRAPUBIC PROSTATECTOMY  2006       Social History     Tobacco Use      Smoking status: Former     Packs/day: 1.00     Years: 0.00     Additional pack years: 0.00     Total pack years: 0.00     Types: Cigarettes     Quit date: 9/10/1994     Years since quittin.1     Passive exposure: Past     Smokeless tobacco: Never     Tobacco comments:     smoke free household.   Substance Use Topics     Alcohol use: Yes     Alcohol/week: 6.0 standard drinks of alcohol     Comment: occ.     Family History   Problem Relation Age of Onset     Cataracts Mother      C.A.D. Father      Psychotic Disorder Father         schizophrenia     Prostate Cancer Father         full surgery     Diabetes Father      Prostate Cancer Maternal Grandmother      Prostate Cancer Maternal Grandfather         partial surgery     Prostate Cancer Paternal Grandfather      Cerebrovascular Disease Paternal Grandfather      Hypertension Sister      Cataracts Sister      Psychotic Disorder Sister         schizophrenia     Asthma Nephew         childhood case     Asthma Other         nephew     Asthma Other      Glaucoma No family hx of      Macular Degeneration No family hx of          Current Outpatient Medications   Medication Sig Dispense Refill     Acetaminophen (TYLENOL PO) As needed       aspirin 325 MG EC tablet Take  by mouth daily. 1 tablet daily         lisinopril (ZESTRIL) 40 MG tablet TAKE 1 TABLET BY MOUTH DAILY 100 tablet 0     meclizine (ANTIVERT) 25 MG tablet Take 1 tablet (25 mg) by mouth 3 times daily as needed for dizziness or nausea 30 tablet 3     simvastatin (ZOCOR) 20 MG tablet Take 1 tablet (20 mg) by mouth At Bedtime 90 tablet 2     triamterene-HCTZ (MAXZIDE-25) 37.5-25 MG tablet Take 1 tablet by mouth daily 90 tablet 4     No Known Allergies          Review of Systems   Constitutional:  Negative for chills and fever.   HENT:  Positive for hearing loss. Negative for congestion, ear pain and sore throat.    Eyes:  Negative for pain and visual disturbance.   Respiratory:  Positive for shortness of  "breath. Negative for cough.    Cardiovascular:  Negative for chest pain, palpitations and peripheral edema.   Gastrointestinal:  Negative for abdominal pain, constipation, diarrhea, heartburn, hematochezia and nausea.   Genitourinary:  Negative for dysuria, frequency, genital sores, hematuria, impotence, penile discharge and urgency.   Musculoskeletal:  Positive for arthralgias and myalgias. Negative for joint swelling.   Skin:  Negative for rash.   Neurological:  Positive for weakness. Negative for dizziness, headaches and paresthesias.   Psychiatric/Behavioral:  Negative for mood changes. The patient is not nervous/anxious.      Constitutional, HEENT, cardiovascular, pulmonary, gi and gu systems are negative, except as otherwise noted.    OBJECTIVE:   BP (!) 146/92 (BP Location: Right arm, Patient Position: Chair, Cuff Size: Adult Large)   Pulse 68   Temp 97.4  F (36.3  C)   Resp 18   Ht 1.628 m (5' 4.1\")   Wt 103 kg (227 lb)   SpO2 96%   BMI 38.84 kg/m   Estimated body mass index is 38.84 kg/m  as calculated from the following:    Height as of this encounter: 1.628 m (5' 4.1\").    Weight as of this encounter: 103 kg (227 lb).  Physical Exam  GENERAL: healthy, alert and no distress  EYES: Eyes grossly normal to inspection, PERRL and conjunctivae and sclerae normal  HENT: ear canals and TM's normal, nose and mouth without ulcers or lesions  NECK: no adenopathy, no asymmetry, masses, or scars and thyroid normal to palpation  RESP: lungs clear to auscultation - no rales, rhonchi or wheezes  CV: regular rate and rhythm, normal S1 S2, no S3 or S4, no murmur, click or rub, no peripheral edema and peripheral pulses strong  ABDOMEN: soft, nontender, no hepatosplenomegaly, no masses and bowel sounds normal  MS: no gross musculoskeletal defects noted, no edema  SKIN: no suspicious lesions or rashes  NEURO: Normal strength and tone, mentation intact and speech normal  BACK: no CVA tenderness, no paralumbar " "tenderness  PSYCH: mentation appears normal, affect normal/bright  LYMPH: no cervical, supraclavicular, axillary, or inguinal adenopathy    Diagnostic Test Results:  Labs reviewed in Epic  No results found for any visits on 11/01/23.    ASSESSMENT / PLAN:   Lico was seen today for physical.    Diagnoses and all orders for this visit:    Atypical chest pain  -     NM Lexiscan stress test; Future    Need for shingles vaccine    Need for vaccination against respiratory syncytial virus    Meniere's disease, left  -     triamterene-HCTZ (MAXZIDE-25) 37.5-25 MG tablet; Take 1 tablet by mouth daily    Hypertension goal BP (blood pressure) < 140/90  -     Adult Eye  Referral; Future  -     triamterene-HCTZ (MAXZIDE-25) 37.5-25 MG tablet; Take 1 tablet by mouth daily  -     Comprehensive metabolic panel (BMP + Alb, Alk Phos, ALT, AST, Total. Bili, TP); Future  -     CBC with platelets and differential; Future  -     TSH with free T4 reflex; Future  -     Comprehensive metabolic panel (BMP + Alb, Alk Phos, ALT, AST, Total. Bili, TP)  -     CBC with platelets and differential  -     TSH with free T4 reflex    Prostate CA (H)  -     PSA, tumor marker; Future  -     PSA, tumor marker    Other fatigue    Vitamin B12 deficiency (non anemic)  -     Vitamin B12; Future  -     Vitamin B12              COUNSELING:  Reviewed preventive health counseling, as reflected in patient instructions       Regular exercise       Healthy diet/nutrition       Vision screening       Hearing screening       Dental care       Fall risk prevention       Colon cancer screening      BMI:   Estimated body mass index is 38.84 kg/m  as calculated from the following:    Height as of this encounter: 1.628 m (5' 4.1\").    Weight as of this encounter: 103 kg (227 lb).         He reports that he quit smoking about 29 years ago. His smoking use included cigarettes. He smoked an average of 1.00 packs per day. He has been exposed to tobacco smoke. He " has never used smokeless tobacco.      Appropriate preventive services were discussed with this patient, including applicable screening as appropriate for fall prevention, nutrition, physical activity, Tobacco-use cessation, weight loss and cognition.  Checklist reviewing preventive services available has been given to the patient.    Reviewed patients plan of care and provided an AVS. The Basic Care Plan (routine screening as documented in Health Maintenance) for Lico meets the Care Plan requirement. This Care Plan has been established and reviewed with the Patient.          Demetrius Lassiter MD  [facility]    Identified Health Risks:  I have reviewed Opioid Use Disorder and Substance Use Disorder risk factors and made any needed referrals.

## 2023-11-13 ENCOUNTER — OFFICE VISIT (OUTPATIENT)
Dept: OPHTHALMOLOGY | Facility: CLINIC | Age: 73
End: 2023-11-13
Payer: COMMERCIAL

## 2023-11-13 DIAGNOSIS — I10 HYPERTENSION GOAL BP (BLOOD PRESSURE) < 140/90: ICD-10-CM

## 2023-11-13 DIAGNOSIS — H52.4 PRESBYOPIA: ICD-10-CM

## 2023-11-13 DIAGNOSIS — Z01.01 ENCOUNTER FOR EXAMINATION OF EYES AND VISION WITH ABNORMAL FINDINGS: ICD-10-CM

## 2023-11-13 DIAGNOSIS — H40.003 GLAUCOMA SUSPECT OF BOTH EYES: ICD-10-CM

## 2023-11-13 DIAGNOSIS — H43.399 VITREOUS OPACITIES: ICD-10-CM

## 2023-11-13 DIAGNOSIS — H35.62 RETINAL HEMORRHAGE OF LEFT EYE: ICD-10-CM

## 2023-11-13 DIAGNOSIS — H35.371 EPIRETINAL MEMBRANE (ERM) OF RIGHT EYE: ICD-10-CM

## 2023-11-13 DIAGNOSIS — Z96.1 PSEUDOPHAKIA: Primary | ICD-10-CM

## 2023-11-13 PROCEDURE — 92015 DETERMINE REFRACTIVE STATE: CPT | Performed by: OPHTHALMOLOGY

## 2023-11-13 PROCEDURE — 92014 COMPRE OPH EXAM EST PT 1/>: CPT | Performed by: OPHTHALMOLOGY

## 2023-11-13 ASSESSMENT — VISUAL ACUITY
CORRECTION_TYPE: GLASSES
OS_CC+: -2
METHOD: SNELLEN - LINEAR
OS_CC: J1-2
OS_CC: 20/25
OD_CC: 20/20
OD_CC: J1+-1

## 2023-11-13 ASSESSMENT — CONF VISUAL FIELD
OD_SUPERIOR_NASAL_RESTRICTION: 0
OS_SUPERIOR_TEMPORAL_RESTRICTION: 0
OD_NORMAL: 1
METHOD: COUNTING FINGERS
OS_INFERIOR_TEMPORAL_RESTRICTION: 0
OS_INFERIOR_NASAL_RESTRICTION: 0
OD_SUPERIOR_TEMPORAL_RESTRICTION: 0
OD_INFERIOR_TEMPORAL_RESTRICTION: 0
OS_NORMAL: 1
OD_INFERIOR_NASAL_RESTRICTION: 0
OS_SUPERIOR_NASAL_RESTRICTION: 0

## 2023-11-13 ASSESSMENT — REFRACTION_WEARINGRX
OS_AXIS: 035
OS_CYLINDER: +0.50
OS_ADD: +3.00
OD_SPHERE: -1.00
OS_SPHERE: -1.50
OD_CYLINDER: SPHERE
OD_ADD: +3.00
SPECS_TYPE: PAL

## 2023-11-13 ASSESSMENT — SLIT LAMP EXAM - LIDS: COMMENTS: 1+ DERMATOCHALASIS - UPPER LID, 1+ SCLERAL SHOW, 1+ MEIBOMIAN GLAND DYSFUNCTION

## 2023-11-13 ASSESSMENT — REFRACTION_MANIFEST
OS_SPHERE: -1.50
OD_CYLINDER: SPHERE
OS_CYLINDER: +0.25
OS_AXIS: 125
OD_ADD: +3.00
OS_ADD: +3.00
OD_SPHERE: -1.00

## 2023-11-13 ASSESSMENT — TONOMETRY
IOP_METHOD: APPLANATION
OS_IOP_MMHG: 20
OD_IOP_MMHG: 22

## 2023-11-13 ASSESSMENT — CUP TO DISC RATIO
OD_RATIO: 0.3
OS_RATIO: 0.6

## 2023-11-13 NOTE — PROGRESS NOTES
" Current Eye Medications:  Artificial tears igtt each eye PRN      Subjective:  Pt returns for a comprehensive eye exam and as in follow-up of glaucoma suspect each eye. He did update his glasses last year and he states that his vision is good. His eyes remain comfortable each eye. He uses artifical tears PRN when his eyes feel dry, for instance when on the computer.      Objective:  See Ophthalmology Exam.       Assessment:  Stable eye exam.      ICD-10-CM    1. Pseuophakia, Yag Caps, ou  Z96.1       2. Glaucoma suspect of both eyes  H40.003       3. Hx of retinal hemorrhage of left eye, remotely  H35.62       4. Epiretinal membrane, mild, of right eye  H35.371       5. Hypertension goal BP (blood pressure) < 140/90  I10 Adult Eye  Referral      6. Vitreous condensations, os  H43.399       7. Encounter for examination of eyes and vision with abnormal findings  Z01.01       8. Presbyopia  H52.4            Plan:  Glasses prescription given - optional    Clean your lids and lashes gently with water daily.     May use artificial tears up to four times a day (like Refresh Optive, Systane Balance, or TheraTears. Avoid \"get the red out\" drops and generic artifical tears).     Can use Lumify for redness relief if desired     Call in July 2024 for an appointment in November 2024 for Complete Exam (plan glauc testing 6 mo thereafter).    Dr. Neal [phone]         "

## 2023-11-13 NOTE — PATIENT INSTRUCTIONS
"Glasses prescription given - optional    Clean your lids and lashes gently with water daily.     May use artificial tears up to four times a day (like Refresh Optive, Systane Balance, or TheraTears. Avoid \"get the red out\" drops and generic artifical tears).     Can use Lumify for redness relief if desired     Call in July 2024 for an appointment in November 2024 for Complete Exam    Dr. Neal [phone]    "

## 2023-11-13 NOTE — LETTER
"    11/13/2023         RE: Lico Osullivan  [address] [facility] [id number]        Dear Colleague,    Thank you for referring your patient, Lico Osullivan, to the [facility]. Please see a copy of my visit note below.     Current Eye Medications:  Artificial tears igtt each eye PRN      Subjective:  Pt returns for a comprehensive eye exam and as in follow-up of glaucoma suspect each eye. He did update his glasses last year and he states that his vision is good. His eyes remain comfortable each eye. He uses artifical tears PRN when his eyes feel dry, for instance when on the computer.      Objective:  See Ophthalmology Exam.       Assessment:  Stable eye exam.      ICD-10-CM    1. Pseuophakia, Yag Caps, ou  Z96.1       2. Glaucoma suspect of both eyes  H40.003       3. Hx of retinal hemorrhage of left eye, remotely  H35.62       4. Epiretinal membrane, mild, of right eye  H35.371       5. Hypertension goal BP (blood pressure) < 140/90  I10 Adult Eye  Referral      6. Vitreous condensations, os  H43.399       7. Encounter for examination of eyes and vision with abnormal findings  Z01.01       8. Presbyopia  H52.4            Plan:  Glasses prescription given - optional    Clean your lids and lashes gently with water daily.     May use artificial tears up to four times a day (like Refresh Optive, Systane Balance, or TheraTears. Avoid \"get the red out\" drops and generic artifical tears).     Can use Lumify for redness relief if desired     Call in July 2024 for an appointment in November 2024 for Complete Exam (plan glauc testing 6 mo thereafter).    Dr. Neal [phone]           Again, thank you for allowing me to participate in the care of your patient.        Sincerely,        Glynn Neal MD  "

## 2023-12-08 DIAGNOSIS — E78.5 HYPERLIPIDEMIA LDL GOAL <130: ICD-10-CM

## 2023-12-08 RX ORDER — SIMVASTATIN 20 MG
20 TABLET ORAL AT BEDTIME
Qty: 70 TABLET | Refills: 4 | Status: SHIPPED | OUTPATIENT
Start: 2023-12-08 | End: 2024-09-04

## 2023-12-27 ENCOUNTER — ANCILLARY PROCEDURE (OUTPATIENT)
Dept: NUCLEAR MEDICINE | Facility: CLINIC | Age: 73
End: 2023-12-27
Attending: INTERNAL MEDICINE
Payer: COMMERCIAL

## 2023-12-27 DIAGNOSIS — R07.89 ATYPICAL CHEST PAIN: ICD-10-CM

## 2023-12-27 DIAGNOSIS — R07.89 ATYPICAL CHEST PAIN: Primary | ICD-10-CM

## 2023-12-27 LAB
CV STRESS MAX HR HE: 90
RATE PRESSURE PRODUCT: NORMAL
STRESS ECHO BASELINE DIASTOLIC HE: 82
STRESS ECHO BASELINE HR: 75 BPM
STRESS ECHO BASELINE SYSTOLIC BP: 132
STRESS ECHO CALCULATED PERCENT HR: 61 %
STRESS ECHO LAST STRESS DIASTOLIC BP: 68
STRESS ECHO LAST STRESS SYSTOLIC BP: 146
STRESS ECHO TARGET HR: 148

## 2023-12-27 PROCEDURE — 93016 CV STRESS TEST SUPVJ ONLY: CPT | Performed by: STUDENT IN AN ORGANIZED HEALTH CARE EDUCATION/TRAINING PROGRAM

## 2023-12-27 PROCEDURE — 93017 CV STRESS TEST TRACING ONLY: CPT | Performed by: INTERNAL MEDICINE

## 2023-12-27 PROCEDURE — 78452 HT MUSCLE IMAGE SPECT MULT: CPT | Performed by: INTERNAL MEDICINE

## 2023-12-27 PROCEDURE — A9502 TC99M TETROFOSMIN: HCPCS | Performed by: INTERNAL MEDICINE

## 2023-12-27 PROCEDURE — 93018 CV STRESS TEST I&R ONLY: CPT | Performed by: INTERNAL MEDICINE

## 2023-12-27 RX ORDER — REGADENOSON 0.08 MG/ML
0.4 INJECTION, SOLUTION INTRAVENOUS ONCE
Status: COMPLETED | OUTPATIENT
Start: 2023-12-27 | End: 2023-12-27

## 2023-12-27 RX ADMIN — REGADENOSON 0.4 MG: 0.08 INJECTION, SOLUTION INTRAVENOUS at 11:15

## 2024-01-05 DIAGNOSIS — I10 HYPERTENSION GOAL BP (BLOOD PRESSURE) < 140/90: ICD-10-CM

## 2024-01-08 RX ORDER — LISINOPRIL 40 MG/1
40 TABLET ORAL DAILY
Qty: 100 TABLET | Refills: 1 | Status: SHIPPED | OUTPATIENT
Start: 2024-01-08 | End: 2024-08-09

## 2024-03-07 ENCOUNTER — TRANSFERRED RECORDS (OUTPATIENT)
Dept: HEALTH INFORMATION MANAGEMENT | Facility: CLINIC | Age: 74
End: 2024-03-07
Payer: COMMERCIAL

## 2024-06-13 DIAGNOSIS — I10 HYPERTENSION GOAL BP (BLOOD PRESSURE) < 140/90: ICD-10-CM

## 2024-06-14 RX ORDER — LISINOPRIL 40 MG/1
40 TABLET ORAL DAILY
Qty: 100 TABLET | Refills: 2 | OUTPATIENT
Start: 2024-06-14

## 2024-08-09 ENCOUNTER — MYC REFILL (OUTPATIENT)
Dept: FAMILY MEDICINE | Facility: CLINIC | Age: 74
End: 2024-08-09
Payer: COMMERCIAL

## 2024-08-09 DIAGNOSIS — I10 HYPERTENSION GOAL BP (BLOOD PRESSURE) < 140/90: ICD-10-CM

## 2024-08-09 RX ORDER — LISINOPRIL 40 MG/1
40 TABLET ORAL DAILY
Qty: 100 TABLET | Refills: 0 | Status: SHIPPED | OUTPATIENT
Start: 2024-08-09

## 2024-08-31 DIAGNOSIS — E78.5 HYPERLIPIDEMIA LDL GOAL <130: ICD-10-CM

## 2024-09-04 RX ORDER — SIMVASTATIN 20 MG
20 TABLET ORAL AT BEDTIME
Qty: 100 TABLET | Refills: 2 | Status: SHIPPED | OUTPATIENT
Start: 2024-09-04

## 2024-09-22 DIAGNOSIS — H81.02 MENIERE'S DISEASE, LEFT: ICD-10-CM

## 2024-09-22 DIAGNOSIS — I10 HYPERTENSION GOAL BP (BLOOD PRESSURE) < 140/90: ICD-10-CM

## 2024-09-23 RX ORDER — TRIAMTERENE/HYDROCHLOROTHIAZID 37.5-25 MG
1 TABLET ORAL DAILY
Qty: 100 TABLET | Refills: 0 | Status: SHIPPED | OUTPATIENT
Start: 2024-09-23

## 2024-10-13 DIAGNOSIS — I10 HYPERTENSION GOAL BP (BLOOD PRESSURE) < 140/90: ICD-10-CM

## 2024-10-14 RX ORDER — LISINOPRIL 40 MG/1
40 TABLET ORAL DAILY
Qty: 100 TABLET | Refills: 0 | Status: SHIPPED | OUTPATIENT
Start: 2024-10-14 | End: 2024-11-11

## 2024-11-06 SDOH — HEALTH STABILITY: PHYSICAL HEALTH: ON AVERAGE, HOW MANY MINUTES DO YOU ENGAGE IN EXERCISE AT THIS LEVEL?: 0 MIN

## 2024-11-06 SDOH — HEALTH STABILITY: PHYSICAL HEALTH: ON AVERAGE, HOW MANY DAYS PER WEEK DO YOU ENGAGE IN MODERATE TO STRENUOUS EXERCISE (LIKE A BRISK WALK)?: 0 DAYS

## 2024-11-06 ASSESSMENT — SOCIAL DETERMINANTS OF HEALTH (SDOH): HOW OFTEN DO YOU GET TOGETHER WITH FRIENDS OR RELATIVES?: ONCE A WEEK

## 2024-11-11 ENCOUNTER — OFFICE VISIT (OUTPATIENT)
Dept: FAMILY MEDICINE | Facility: CLINIC | Age: 74
End: 2024-11-11
Payer: COMMERCIAL

## 2024-11-11 VITALS
HEART RATE: 88 BPM | RESPIRATION RATE: 20 BRPM | DIASTOLIC BLOOD PRESSURE: 76 MMHG | HEIGHT: 64 IN | BODY MASS INDEX: 38.24 KG/M2 | TEMPERATURE: 97.7 F | SYSTOLIC BLOOD PRESSURE: 130 MMHG | WEIGHT: 224 LBS | OXYGEN SATURATION: 96 %

## 2024-11-11 DIAGNOSIS — C61 PROSTATE CA (H): ICD-10-CM

## 2024-11-11 DIAGNOSIS — E66.01 SEVERE OBESITY (BMI 35.0-35.9 WITH COMORBIDITY) (H): ICD-10-CM

## 2024-11-11 DIAGNOSIS — E78.5 HYPERLIPIDEMIA LDL GOAL <130: Primary | ICD-10-CM

## 2024-11-11 DIAGNOSIS — Z12.11 SPECIAL SCREENING FOR MALIGNANT NEOPLASMS, COLON: ICD-10-CM

## 2024-11-11 DIAGNOSIS — H81.02 MENIERE'S DISEASE, LEFT: ICD-10-CM

## 2024-11-11 DIAGNOSIS — I10 HYPERTENSION GOAL BP (BLOOD PRESSURE) < 140/90: ICD-10-CM

## 2024-11-11 DIAGNOSIS — Z23 NEED FOR SHINGLES VACCINE: ICD-10-CM

## 2024-11-11 LAB
ALBUMIN SERPL BCG-MCNC: 4.1 G/DL (ref 3.5–5.2)
ALP SERPL-CCNC: 95 U/L (ref 40–150)
ALT SERPL W P-5'-P-CCNC: 27 U/L (ref 0–70)
ANION GAP SERPL CALCULATED.3IONS-SCNC: 10 MMOL/L (ref 7–15)
AST SERPL W P-5'-P-CCNC: 30 U/L (ref 0–45)
BILIRUB SERPL-MCNC: 0.6 MG/DL
BUN SERPL-MCNC: 18.5 MG/DL (ref 8–23)
CALCIUM SERPL-MCNC: 9.7 MG/DL (ref 8.8–10.4)
CHLORIDE SERPL-SCNC: 99 MMOL/L (ref 98–107)
CHOLEST SERPL-MCNC: 198 MG/DL
CREAT SERPL-MCNC: 1.17 MG/DL (ref 0.67–1.17)
EGFRCR SERPLBLD CKD-EPI 2021: 66 ML/MIN/1.73M2
FASTING STATUS PATIENT QL REPORTED: YES
FASTING STATUS PATIENT QL REPORTED: YES
GLUCOSE SERPL-MCNC: 112 MG/DL (ref 70–99)
HCO3 SERPL-SCNC: 28 MMOL/L (ref 22–29)
HDLC SERPL-MCNC: 55 MG/DL
LDLC SERPL CALC-MCNC: 118 MG/DL
NONHDLC SERPL-MCNC: 143 MG/DL
POTASSIUM SERPL-SCNC: 4.9 MMOL/L (ref 3.4–5.3)
PROT SERPL-MCNC: 6.8 G/DL (ref 6.4–8.3)
PSA SERPL DL<=0.01 NG/ML-MCNC: 0.05 NG/ML (ref 0–6.5)
SODIUM SERPL-SCNC: 137 MMOL/L (ref 135–145)
TRIGL SERPL-MCNC: 125 MG/DL

## 2024-11-11 PROCEDURE — 80053 COMPREHEN METABOLIC PANEL: CPT | Performed by: INTERNAL MEDICINE

## 2024-11-11 PROCEDURE — 80061 LIPID PANEL: CPT | Performed by: INTERNAL MEDICINE

## 2024-11-11 PROCEDURE — 36415 COLL VENOUS BLD VENIPUNCTURE: CPT | Performed by: INTERNAL MEDICINE

## 2024-11-11 PROCEDURE — 84153 ASSAY OF PSA TOTAL: CPT | Performed by: INTERNAL MEDICINE

## 2024-11-11 PROCEDURE — G0439 PPPS, SUBSEQ VISIT: HCPCS | Performed by: INTERNAL MEDICINE

## 2024-11-11 RX ORDER — TRIAMTERENE AND HYDROCHLOROTHIAZIDE 37.5; 25 MG/1; MG/1
1 TABLET ORAL DAILY
Qty: 100 TABLET | Refills: 4 | Status: SHIPPED | OUTPATIENT
Start: 2024-11-11

## 2024-11-11 RX ORDER — LISINOPRIL 40 MG/1
40 TABLET ORAL DAILY
Qty: 100 TABLET | Refills: 4 | Status: SHIPPED | OUTPATIENT
Start: 2024-11-11

## 2024-11-11 ASSESSMENT — PAIN SCALES - GENERAL: PAINLEVEL_OUTOF10: NO PAIN (0)

## 2024-11-11 NOTE — PROGRESS NOTES
"Preventive Care Visit  [facility] DARIEN Lassiter MD, Internal Medicine - Pediatrics  Nov 11, 2024      Assessment & Plan   Problem List Items Addressed This Visit       HYPERLIPIDEMIA LDL GOAL <130 - Primary    Relevant Orders    Lipid panel reflex to direct LDL Non-fasting (Completed)    OPTOMETRY REFERRAL    Hypertension goal BP (blood pressure) < 140/90    Relevant Medications    triamterene-HCTZ (MAXZIDE-25) 37.5-25 MG tablet    lisinopril (ZESTRIL) 40 MG tablet    Other Relevant Orders    Comprehensive metabolic panel (BMP + Alb, Alk Phos, ALT, AST, Total. Bili, TP) (Completed)    Prostate CA (H)    Relevant Orders    PSA, tumor marker (Completed)    Severe obesity (BMI 35.0-35.9 with comorbidity) (H)     Other Visit Diagnoses       Need for shingles vaccine        Meniere's disease, left        Relevant Medications    triamterene-HCTZ (MAXZIDE-25) 37.5-25 MG tablet    Special screening for malignant neoplasms, colon        Relevant Orders    Colonoscopy Screening  Referral                    BMI  Estimated body mass index is 38.24 kg/m  as calculated from the following:    Height as of this encounter: 1.63 m (5' 4.17\").    Weight as of this encounter: 101.6 kg (224 lb).   Weight management plan: Discussed healthy diet and exercise guidelines    Counseling  Appropriate preventive services were addressed with this patient via screening, questionnaire, or discussion as appropriate for fall prevention, nutrition, physical activity, Tobacco-use cessation, social engagement, weight loss and cognition.  Checklist reviewing preventive services available has been given to the patient.  Reviewed patient's diet, addressing concerns and/or questions.   He is at risk for psychosocial distress and has been provided with information to reduce risk.   The patient was provided with written information regarding signs of hearing loss.     Work on weight loss  Regular exercise      Subjective "   Lico is a 74 year old, presenting for the following:  Physical            HPI          Health Care Directive  Patient does not have a Health Care Directive: Discussed advance care planning with patient; information given to patient to review.      11/6/2024   General Health   How would you rate your overall physical health? (!) FAIR   Feel stress (tense, anxious, or unable to sleep) Only a little      (!) STRESS CONCERN      11/6/2024   Nutrition   Diet: Regular (no restrictions)            11/6/2024   Exercise   Days per week of moderate/strenous exercise 0 days   Average minutes spent exercising at this level 0 min      (!) EXERCISE CONCERN      11/6/2024   Social Factors   Frequency of gathering with friends or relatives Once a week   Worry food won't last until get money to buy more No   Food not last or not have enough money for food? No   Do you have housing? (Housing is defined as stable permanent housing and does not include staying ouside in a car, in a tent, in an abandoned building, in an overnight shelter, or couch-surfing.) Yes   Are you worried about losing your housing? No   Lack of transportation? No   Unable to get utilities (heat,electricity)? No            11/6/2024   Fall Risk   Fallen 2 or more times in the past year? No     No    Trouble with walking or balance? No     No        Patient-reported    Multiple values from one day are sorted in reverse-chronological order          11/6/2024   Activities of Daily Living- Home Safety   Needs help with the following daily activites None of the above   Safety concerns in the home None of the above            11/6/2024   Dental   Dentist two times every year? Yes            11/6/2024   Hearing Screening   Hearing concerns? (!) I NEED TO ASK PEOPLE TO SPEAK UP OR REPEAT THEMSELVES.    (!) TROUBLE UNDERSTANDING SOFT OR WHISPERED SPEECH.       Multiple values from one day are sorted in reverse-chronological order         11/6/2024   Driving Risk  Screening   Patient/family members have concerns about driving No            2024   General Alertness/Fatigue Screening   Have you been more tired than usual lately? No            2024   Urinary Incontinence Screening   Bothered by leaking urine in past 6 months No            2024   TB Screening   Were you born outside of the [place]? No            Today's PHQ-2 Score:       11/10/2024     3:42 PM   PHQ-2 (  [employer])   Q1: Little interest or pleasure in doing things 0    Q2: Feeling down, depressed or hopeless 0    PHQ-2 Score 0    Q1: Little interest or pleasure in doing things Not at all   Q2: Feeling down, depressed or hopeless Not at all   PHQ-2 Score 0       Patient-reported           2024   Substance Use   Alcohol more than 3/day or more than 7/wk No   Do you have a current opioid prescription? No   How severe/bad is pain from 1 to 10? 2/10   Do you use any other substances recreationally? No        Social History     Tobacco Use    Smoking status: Former     Current packs/day: 0.00     Types: Cigarettes     Start date: 9/10/1994     Quit date: 9/10/1994     Years since quittin.1     Passive exposure: Past    Smokeless tobacco: Never    Tobacco comments:     smoke free household.   Vaping Use    Vaping status: Never Used   Substance Use Topics    Alcohol use: Yes     Alcohol/week: 6.0 standard drinks of alcohol     Comment: occ.    Drug use: No     Comment: marijuana use in the past       ASCVD Risk   The ASCVD Risk score (Po VALLE, et al., 2019) failed to calculate for the following reasons:    Risk score cannot be calculated because patient has a medical history suggesting prior/existing ASCVD            Reviewed and updated as needed this visit by Provider      Problems               Lab work is in process  Labs reviewed in EPIC  BP Readings from Last 3 Encounters:   24 130/76   23 138/82   23 (!) 146/82    Wt Readings from Last 3 Encounters:   24  101.6 kg (224 lb)   23 103 kg (227 lb)   04/10/23 100.7 kg (222 lb)                  Patient Active Problem List   Diagnosis    Prostate CA (H)    Colon polyp    HYPERLIPIDEMIA LDL GOAL <130    Hypertension goal BP (blood pressure) < 140/90    Vitreous condensations, os    Pseuophakia, Yag Caps, ou    Severe obesity (BMI 35.0-35.9 with comorbidity) (H)    Hx of retinal hemorrhage of left eye, remotely    Epiretinal membrane, mild, of right eye    Retinal hemorrhage of right eye    Glaucoma suspect of both eyes     Past Surgical History:   Procedure Laterality Date    CATARACT IOL, RT/LT      [facility] SURGICAL PATHOLOGY          COLONOSCOPY  2011    Procedure:COMBINED COLONOSCOPY, REMOVE TUMOR/POLYP/LESION BY SNARE; Surgeon:ALBARO LANGFORD; Location:[facility] OR    LASER YAG CAPSULOTOMY  10/2015; 2016    left eye; right eye    PHACOEMULSIFICATION WITH STANDARD INTRAOCULAR LENS IMPLANT  10/2012; 2014    left eye; right eye    SUPRAPUBIC PROSTATECTOMY         Social History     Tobacco Use    Smoking status: Former     Current packs/day: 0.00     Types: Cigarettes     Start date: 9/10/1994     Quit date: 9/10/1994     Years since quittin.1     Passive exposure: Past    Smokeless tobacco: Never    Tobacco comments:     smoke free household.   Substance Use Topics    Alcohol use: Yes     Alcohol/week: 6.0 standard drinks of alcohol     Comment: occ.     Family History   Problem Relation Age of Onset    Cataracts Mother     C.A.D. Father     Psychotic Disorder Father         schizophrenia    Prostate Cancer Father         full surgery    Diabetes Father     Prostate Cancer Maternal Grandmother     Prostate Cancer Maternal Grandfather         partial surgery    Prostate Cancer Paternal Grandfather     Cerebrovascular Disease Paternal Grandfather     Hypertension Sister     Cataracts Sister     Psychotic Disorder Sister         schizophrenia    Asthma Nephew         childhood case    Asthma Other          nephew    Asthma Other     Glaucoma No family hx of     Macular Degeneration No family hx of          Current Outpatient Medications   Medication Sig Dispense Refill    Acetaminophen (TYLENOL PO) As needed      aspirin 325 MG EC tablet Take  by mouth daily. 1 tablet daily        hypromellose (ARTIFICIAL TEARS) 0.5 % SOLN ophthalmic solution Place 1 drop into both eyes 3 times daily as needed for dry eyes      lisinopril (ZESTRIL) 40 MG tablet Take 1 tablet (40 mg) by mouth daily. 100 tablet 4    meclizine (ANTIVERT) 25 MG tablet Take 1 tablet (25 mg) by mouth 3 times daily as needed for dizziness or nausea 30 tablet 3    simvastatin (ZOCOR) 20 MG tablet TAKE 1 TABLET BY MOUTH AT  BEDTIME 100 tablet 2    triamterene-HCTZ (MAXZIDE-25) 37.5-25 MG tablet Take 1 tablet by mouth daily. 100 tablet 4     No Known Allergies  Current providers sharing in care for this patient include:  Patient Care Team:  Demetrius Lassiter MD as PCP - General (Internal Medicine - Pediatrics)  Demetrius Lassiter MD as Assigned PCP  Glynn Neal MD as Assigned Surgical Provider    The following health maintenance items are reviewed in Epic and correct as of today:  Health Maintenance   Topic Date Due    ZOSTER IMMUNIZATION (2 of 3) 11/20/2016    INFLUENZA VACCINE (1) Never done    COVID-19 Vaccine (3 - 2024-25 season) 09/01/2024    MEDICARE ANNUAL WELLNESS VISIT  11/01/2024    EYE EXAM  11/13/2024    BMP  11/11/2025    LIPID  11/11/2025    ANNUAL REVIEW OF HM ORDERS  11/11/2025    FALL RISK ASSESSMENT  11/11/2025    RSV VACCINE (1 - 1-dose 75+ series) 06/05/2026    DTAP/TDAP/TD IMMUNIZATION (2 - Td or Tdap) 09/26/2026    GLUCOSE  11/11/2027    ADVANCE CARE PLANNING  11/11/2029    COLORECTAL CANCER SCREENING  01/04/2032    HEPATITIS C SCREENING  Completed    PHQ-2 (once per calendar year)  Completed    Pneumococcal Vaccine: 65+ Years  Completed    AORTIC ANEURYSM SCREENING (SYSTEM ASSIGNED)  Completed    HPV IMMUNIZATION  Aged Out     "MENINGITIS IMMUNIZATION  Aged Out    RSV MONOCLONAL ANTIBODY  Aged Out         Review of Systems  Constitutional, HEENT, cardiovascular, pulmonary, gi and gu systems are negative, except as otherwise noted.     Objective    Exam  /76 (BP Location: Left arm, Patient Position: Sitting, Cuff Size: Adult Large)   Pulse 88   Temp 97.7  F (36.5  C) (Oral)   Resp 20   Ht 1.63 m (5' 4.17\")   Wt 101.6 kg (224 lb)   SpO2 96%   BMI 38.24 kg/m     Estimated body mass index is 38.24 kg/m  as calculated from the following:    Height as of this encounter: 1.63 m (5' 4.17\").    Weight as of this encounter: 101.6 kg (224 lb).    Physical Exam  GENERAL: alert and no distress  EYES: Eyes grossly normal to inspection, PERRL and conjunctivae and sclerae normal  HENT: ear canals and TM's normal, nose and mouth without ulcers or lesions  NECK: no adenopathy, no asymmetry, masses, or scars  RESP: lungs clear to auscultation - no rales, rhonchi or wheezes  CV: regular rate and rhythm, normal S1 S2, no S3 or S4, no murmur, click or rub, no peripheral edema  ABDOMEN: soft, nontender, no hepatosplenomegaly, no masses and bowel sounds normal  MS: no gross musculoskeletal defects noted, no edema  SKIN: no suspicious lesions or rashes  NEURO: Normal strength and tone, mentation intact and speech normal  BACK: no CVA tenderness, no paralumbar tenderness  PSYCH: mentation appears normal, affect normal/bright  LYMPH: no cervical, supraclavicular, axillary, or inguinal adenopathy         11/11/2024   Mini Cog   Clock Draw Score 2 Normal   3 Item Recall 3 objects recalled   Mini Cog Total Score 5                 Signed Electronically by: Demetrius Lassiter MD    "

## 2024-11-15 ENCOUNTER — OFFICE VISIT (OUTPATIENT)
Dept: OPHTHALMOLOGY | Facility: CLINIC | Age: 74
End: 2024-11-15
Payer: COMMERCIAL

## 2024-11-15 DIAGNOSIS — H40.003 GLAUCOMA SUSPECT OF BOTH EYES: ICD-10-CM

## 2024-11-15 DIAGNOSIS — E78.5 HYPERLIPIDEMIA LDL GOAL <130: ICD-10-CM

## 2024-11-15 DIAGNOSIS — Z01.01 ENCOUNTER FOR EXAMINATION OF EYES AND VISION WITH ABNORMAL FINDINGS: Primary | ICD-10-CM

## 2024-11-15 DIAGNOSIS — Z96.1 PSEUDOPHAKIA: ICD-10-CM

## 2024-11-15 DIAGNOSIS — H52.4 PRESBYOPIA: ICD-10-CM

## 2024-11-15 DIAGNOSIS — H35.62 RETINAL HEMORRHAGE OF LEFT EYE: ICD-10-CM

## 2024-11-15 DIAGNOSIS — H43.399 VITREOUS OPACITIES: ICD-10-CM

## 2024-11-15 DIAGNOSIS — H35.371 EPIRETINAL MEMBRANE (ERM) OF RIGHT EYE: ICD-10-CM

## 2024-11-15 PROCEDURE — 92134 CPTRZ OPH DX IMG PST SGM RTA: CPT | Performed by: OPHTHALMOLOGY

## 2024-11-15 PROCEDURE — 92014 COMPRE OPH EXAM EST PT 1/>: CPT | Performed by: OPHTHALMOLOGY

## 2024-11-15 PROCEDURE — 92015 DETERMINE REFRACTIVE STATE: CPT | Performed by: OPHTHALMOLOGY

## 2024-11-15 ASSESSMENT — SLIT LAMP EXAM - LIDS: COMMENTS: 1+ DERMATOCHALASIS - UPPER LID, 1+ SCLERAL SHOW, 1+ MEIBOMIAN GLAND DYSFUNCTION

## 2024-11-15 ASSESSMENT — TONOMETRY
IOP_METHOD: APPLANATION
OS_IOP_MMHG: 20
OD_IOP_MMHG: 20

## 2024-11-15 ASSESSMENT — CONF VISUAL FIELD
OS_INFERIOR_NASAL_RESTRICTION: 0
OS_SUPERIOR_TEMPORAL_RESTRICTION: 0
OD_SUPERIOR_NASAL_RESTRICTION: 0
OD_NORMAL: 1
OS_SUPERIOR_NASAL_RESTRICTION: 0
OS_INFERIOR_TEMPORAL_RESTRICTION: 0
OD_INFERIOR_NASAL_RESTRICTION: 0
OS_NORMAL: 1
OD_SUPERIOR_TEMPORAL_RESTRICTION: 0
OD_INFERIOR_TEMPORAL_RESTRICTION: 0

## 2024-11-15 ASSESSMENT — REFRACTION_MANIFEST
OD_ADD: +2.75
OS_ADD: +2.75
OD_SPHERE: -1.25
OS_SPHERE: -1.50
OS_CYLINDER: +0.25
OD_CYLINDER: SPHERE
OS_AXIS: 170

## 2024-11-15 ASSESSMENT — VISUAL ACUITY
CORRECTION_TYPE: GLASSES
OS_CC+: -2
OD_CC: 20/20
OS_CC: 20/20
METHOD: SNELLEN - LINEAR

## 2024-11-15 ASSESSMENT — REFRACTION_WEARINGRX
OD_CYLINDER: SPHERE
OS_ADD: +3.00
OD_SPHERE: -1.00
OS_CYLINDER: +0.50
SPECS_TYPE: PAL
OS_AXIS: 030
OD_ADD: +3.00
OS_SPHERE: -1.50

## 2024-11-15 ASSESSMENT — CUP TO DISC RATIO
OD_RATIO: 0.3
OS_RATIO: 0.6

## 2024-11-15 NOTE — PATIENT INSTRUCTIONS
"Glasses prescription given - optional    May use artificial tears up to four times a day (like Refresh Optive, Systane Balance, or TheraTears. Avoid \"get the red out\" drops and generic artifical tears).     Possible posterior vitreous detachment (sudden onset large floater and/or flashing lights) both eyes discussed.     Call in July 2025 for an appointment in November 2025 for Complete Exam    Dr. Neal [phone]    "

## 2024-11-15 NOTE — LETTER
"11/15/2024      Lico Osullivan  [address] [place] [id number]      Dear Colleague,    Thank you for referring your patient, Lico Osullivan, to the [facility]. Please see a copy of my visit note below.     Current Eye Medications: artificial tears two or three times a week both eyes     Subjective:  here for complete eye exam today.  Eyes are getting more dry lately. Hasn't noticed any big vision changes. Usually watches TV without glasses. Will take OCT today of the retina      Objective:  See Ophthalmology Exam.       Assessment:  Stable eye exam.      ICD-10-CM    1. Encounter for examination of eyes and vision with abnormal findings  Z01.01       2. Presbyopia  H52.4       3. Pseuophakia, Yag Caps, ou  Z96.1       4. Glaucoma suspect of both eyes  H40.003       5. Hx of retinal hemorrhage of left eye, remotely  H35.62       6. Epiretinal membrane, mild, of right eye  H35.371       7. Vitreous condensations, os  H43.399       8. Hyperlipidemia LDL goal <130  E78.5 OPTOMETRY REFERRAL           Plan  Glasses prescription given - optional    May use artificial tears up to four times a day (like Refresh Optive, Systane Balance, or TheraTears. Avoid \"get the red out\" drops and generic artifical tears).     Possible posterior vitreous detachment (sudden onset large floater and/or flashing lights) both eyes discussed.     Call in July 2025 for an appointment in November 2025 for Complete Exam    Dr. Neal [phone]            Again, thank you for allowing me to participate in the care of your patient.        Sincerely,        Glynn Neal MD  "

## 2024-11-15 NOTE — PROGRESS NOTES
" Current Eye Medications: artificial tears two or three times a week both eyes     Subjective:  here for complete eye exam today.  Eyes are getting more dry lately. Hasn't noticed any big vision changes. Usually watches TV without glasses. Will take OCT today of the retina      Objective:  See Ophthalmology Exam.       Assessment:  Stable eye exam.      ICD-10-CM    1. Encounter for examination of eyes and vision with abnormal findings  Z01.01       2. Presbyopia  H52.4       3. Pseuophakia, Yag Caps, ou  Z96.1       4. Glaucoma suspect of both eyes  H40.003       5. Hx of retinal hemorrhage of left eye, remotely  H35.62       6. Epiretinal membrane, mild, of right eye  H35.371       7. Vitreous condensations, os  H43.399       8. Hyperlipidemia LDL goal <130  E78.5 OPTOMETRY REFERRAL           Plan  Glasses prescription given - optional    May use artificial tears up to four times a day (like Refresh Optive, Systane Balance, or TheraTears. Avoid \"get the red out\" drops and generic artifical tears).     Possible posterior vitreous detachment (sudden onset large floater and/or flashing lights) both eyes discussed.     Call in July 2025 for an appointment in November 2025 for Complete Exam    Dr. Neal [phone]        "

## 2024-11-16 ASSESSMENT — PACHYMETRY
OD_CT(UM): 580
OS_CT(UM): 577

## 2025-02-01 ENCOUNTER — MYC MEDICAL ADVICE (OUTPATIENT)
Dept: FAMILY MEDICINE | Facility: CLINIC | Age: 75
End: 2025-02-01
Payer: COMMERCIAL

## 2025-02-01 DIAGNOSIS — Z12.11 ENCOUNTER FOR SCREENING COLONOSCOPY: Primary | ICD-10-CM

## 2025-02-04 ENCOUNTER — TRANSFERRED RECORDS (OUTPATIENT)
Dept: HEALTH INFORMATION MANAGEMENT | Facility: CLINIC | Age: 75
End: 2025-02-04
Payer: COMMERCIAL

## 2025-04-04 ENCOUNTER — MYC REFILL (OUTPATIENT)
Dept: FAMILY MEDICINE | Facility: CLINIC | Age: 75
End: 2025-04-04
Payer: COMMERCIAL

## 2025-04-04 DIAGNOSIS — E78.5 HYPERLIPIDEMIA LDL GOAL <130: ICD-10-CM

## 2025-04-05 RX ORDER — SIMVASTATIN 20 MG
20 TABLET ORAL AT BEDTIME
Qty: 100 TABLET | Refills: 0 | Status: SHIPPED | OUTPATIENT
Start: 2025-04-05

## 2025-05-27 ENCOUNTER — MYC REFILL (OUTPATIENT)
Dept: FAMILY MEDICINE | Facility: CLINIC | Age: 75
End: 2025-05-27
Payer: COMMERCIAL

## 2025-05-27 DIAGNOSIS — H81.02 MENIERE'S DISEASE, LEFT: ICD-10-CM

## 2025-05-27 DIAGNOSIS — I10 HYPERTENSION GOAL BP (BLOOD PRESSURE) < 140/90: ICD-10-CM

## 2025-05-28 RX ORDER — LISINOPRIL 40 MG/1
40 TABLET ORAL DAILY
Qty: 100 TABLET | Refills: 4 | OUTPATIENT
Start: 2025-05-28

## 2025-05-28 RX ORDER — TRIAMTERENE AND HYDROCHLOROTHIAZIDE 37.5; 25 MG/1; MG/1
1 TABLET ORAL DAILY
Qty: 100 TABLET | Refills: 4 | OUTPATIENT
Start: 2025-05-28